# Patient Record
Sex: MALE | Race: WHITE | NOT HISPANIC OR LATINO
[De-identification: names, ages, dates, MRNs, and addresses within clinical notes are randomized per-mention and may not be internally consistent; named-entity substitution may affect disease eponyms.]

---

## 2015-01-31 RX ORDER — ASPIRIN/CALCIUM CARB/MAGNESIUM 324 MG
1 TABLET ORAL
Qty: 0 | Refills: 0 | COMMUNITY
Start: 2015-01-31

## 2015-01-31 RX ORDER — CLOPIDOGREL BISULFATE 75 MG/1
1 TABLET, FILM COATED ORAL
Qty: 0 | Refills: 0 | COMMUNITY
Start: 2015-01-31

## 2017-01-19 ENCOUNTER — RX RENEWAL (OUTPATIENT)
Age: 72
End: 2017-01-19

## 2017-02-21 ENCOUNTER — APPOINTMENT (OUTPATIENT)
Dept: GASTROENTEROLOGY | Facility: CLINIC | Age: 72
End: 2017-02-21

## 2017-02-21 VITALS
DIASTOLIC BLOOD PRESSURE: 90 MMHG | BODY MASS INDEX: 25.01 KG/M2 | TEMPERATURE: 97.9 F | WEIGHT: 165 LBS | HEIGHT: 68 IN | SYSTOLIC BLOOD PRESSURE: 160 MMHG

## 2017-02-21 DIAGNOSIS — Z87.898 PERSONAL HISTORY OF OTHER SPECIFIED CONDITIONS: ICD-10-CM

## 2017-02-21 DIAGNOSIS — E73.9 LACTOSE INTOLERANCE, UNSPECIFIED: ICD-10-CM

## 2017-02-21 DIAGNOSIS — Z78.9 OTHER SPECIFIED HEALTH STATUS: ICD-10-CM

## 2017-03-20 ENCOUNTER — RX RENEWAL (OUTPATIENT)
Age: 72
End: 2017-03-20

## 2017-04-11 ENCOUNTER — APPOINTMENT (OUTPATIENT)
Dept: UROLOGY | Facility: CLINIC | Age: 72
End: 2017-04-11

## 2017-04-11 ENCOUNTER — OUTPATIENT (OUTPATIENT)
Dept: OUTPATIENT SERVICES | Facility: HOSPITAL | Age: 72
LOS: 1 days | End: 2017-04-11
Payer: MEDICARE

## 2017-04-11 DIAGNOSIS — R35.0 FREQUENCY OF MICTURITION: ICD-10-CM

## 2017-04-11 DIAGNOSIS — Z98.89 OTHER SPECIFIED POSTPROCEDURAL STATES: Chronic | ICD-10-CM

## 2017-04-11 DIAGNOSIS — H26.9 UNSPECIFIED CATARACT: Chronic | ICD-10-CM

## 2017-04-11 DIAGNOSIS — K08.409 PARTIAL LOSS OF TEETH, UNSPECIFIED CAUSE, UNSPECIFIED CLASS: Chronic | ICD-10-CM

## 2017-04-11 DIAGNOSIS — N20.1 CALCULUS OF URETER: Chronic | ICD-10-CM

## 2017-04-11 DIAGNOSIS — N20.1 CALCULUS OF URETER: ICD-10-CM

## 2017-04-11 PROCEDURE — 76775 US EXAM ABDO BACK WALL LIM: CPT

## 2017-07-24 ENCOUNTER — OTHER (OUTPATIENT)
Age: 72
End: 2017-07-24

## 2017-07-31 ENCOUNTER — EMERGENCY (EMERGENCY)
Facility: HOSPITAL | Age: 72
LOS: 1 days | Discharge: ANOTHER HOSPITAL | End: 2017-07-31
Attending: EMERGENCY MEDICINE | Admitting: EMERGENCY MEDICINE
Payer: MEDICARE

## 2017-07-31 VITALS
WEIGHT: 160.06 LBS | HEART RATE: 81 BPM | DIASTOLIC BLOOD PRESSURE: 120 MMHG | OXYGEN SATURATION: 99 % | HEIGHT: 68 IN | RESPIRATION RATE: 16 BRPM | SYSTOLIC BLOOD PRESSURE: 211 MMHG | TEMPERATURE: 98 F

## 2017-07-31 DIAGNOSIS — Z95.5 PRESENCE OF CORONARY ANGIOPLASTY IMPLANT AND GRAFT: ICD-10-CM

## 2017-07-31 DIAGNOSIS — I25.2 OLD MYOCARDIAL INFARCTION: ICD-10-CM

## 2017-07-31 DIAGNOSIS — Z79.82 LONG TERM (CURRENT) USE OF ASPIRIN: ICD-10-CM

## 2017-07-31 DIAGNOSIS — H26.9 UNSPECIFIED CATARACT: Chronic | ICD-10-CM

## 2017-07-31 DIAGNOSIS — Z87.891 PERSONAL HISTORY OF NICOTINE DEPENDENCE: ICD-10-CM

## 2017-07-31 DIAGNOSIS — R07.89 OTHER CHEST PAIN: ICD-10-CM

## 2017-07-31 DIAGNOSIS — Z98.89 OTHER SPECIFIED POSTPROCEDURAL STATES: Chronic | ICD-10-CM

## 2017-07-31 DIAGNOSIS — I25.10 ATHEROSCLEROTIC HEART DISEASE OF NATIVE CORONARY ARTERY WITHOUT ANGINA PECTORIS: Chronic | ICD-10-CM

## 2017-07-31 DIAGNOSIS — N20.1 CALCULUS OF URETER: Chronic | ICD-10-CM

## 2017-07-31 DIAGNOSIS — Z79.01 LONG TERM (CURRENT) USE OF ANTICOAGULANTS: ICD-10-CM

## 2017-07-31 DIAGNOSIS — I20.0 UNSTABLE ANGINA: ICD-10-CM

## 2017-07-31 DIAGNOSIS — K08.409 PARTIAL LOSS OF TEETH, UNSPECIFIED CAUSE, UNSPECIFIED CLASS: Chronic | ICD-10-CM

## 2017-07-31 LAB
ANION GAP SERPL CALC-SCNC: 6 MMOL/L — SIGNIFICANT CHANGE UP (ref 5–17)
APTT BLD: 28.2 SEC — SIGNIFICANT CHANGE UP (ref 27.5–37.4)
BASOPHILS # BLD AUTO: 0.1 K/UL — SIGNIFICANT CHANGE UP (ref 0–0.2)
BASOPHILS NFR BLD AUTO: 0.8 % — SIGNIFICANT CHANGE UP (ref 0–2)
BUN SERPL-MCNC: 23 MG/DL — SIGNIFICANT CHANGE UP (ref 7–23)
CALCIUM SERPL-MCNC: 9.4 MG/DL — SIGNIFICANT CHANGE UP (ref 8.5–10.1)
CHLORIDE SERPL-SCNC: 107 MMOL/L — SIGNIFICANT CHANGE UP (ref 96–108)
CK MB BLD-MCNC: 1.7 % — SIGNIFICANT CHANGE UP (ref 0–3.5)
CK MB CFR SERPL CALC: 1.4 NG/ML — SIGNIFICANT CHANGE UP (ref 0–3.6)
CK SERPL-CCNC: 82 U/L — SIGNIFICANT CHANGE UP (ref 26–308)
CO2 SERPL-SCNC: 28 MMOL/L — SIGNIFICANT CHANGE UP (ref 22–31)
CREAT SERPL-MCNC: 1.2 MG/DL — SIGNIFICANT CHANGE UP (ref 0.5–1.3)
EOSINOPHIL # BLD AUTO: 0.2 K/UL — SIGNIFICANT CHANGE UP (ref 0–0.5)
EOSINOPHIL NFR BLD AUTO: 2.4 % — SIGNIFICANT CHANGE UP (ref 0–6)
GLUCOSE SERPL-MCNC: 122 MG/DL — HIGH (ref 70–99)
HCT VFR BLD CALC: 43 % — SIGNIFICANT CHANGE UP (ref 39–50)
HGB BLD-MCNC: 14.4 G/DL — SIGNIFICANT CHANGE UP (ref 13–17)
INR BLD: 1.02 RATIO — SIGNIFICANT CHANGE UP (ref 0.88–1.16)
LYMPHOCYTES # BLD AUTO: 1.2 K/UL — SIGNIFICANT CHANGE UP (ref 1–3.3)
LYMPHOCYTES # BLD AUTO: 16.9 % — SIGNIFICANT CHANGE UP (ref 13–44)
MCHC RBC-ENTMCNC: 31.2 PG — SIGNIFICANT CHANGE UP (ref 27–34)
MCHC RBC-ENTMCNC: 33.4 GM/DL — SIGNIFICANT CHANGE UP (ref 32–36)
MCV RBC AUTO: 93.4 FL — SIGNIFICANT CHANGE UP (ref 80–100)
MONOCYTES # BLD AUTO: 0.6 K/UL — SIGNIFICANT CHANGE UP (ref 0–0.9)
MONOCYTES NFR BLD AUTO: 9.2 % — HIGH (ref 1–9)
NEUTROPHILS # BLD AUTO: 4.9 K/UL — SIGNIFICANT CHANGE UP (ref 1.8–7.4)
NEUTROPHILS NFR BLD AUTO: 70.7 % — SIGNIFICANT CHANGE UP (ref 43–77)
PLATELET # BLD AUTO: 150 K/UL — SIGNIFICANT CHANGE UP (ref 150–400)
POTASSIUM SERPL-MCNC: 4.8 MMOL/L — SIGNIFICANT CHANGE UP (ref 3.5–5.3)
POTASSIUM SERPL-SCNC: 4.8 MMOL/L — SIGNIFICANT CHANGE UP (ref 3.5–5.3)
PROTHROM AB SERPL-ACNC: 11.1 SEC — SIGNIFICANT CHANGE UP (ref 9.8–12.7)
RBC # BLD: 4.6 M/UL — SIGNIFICANT CHANGE UP (ref 4.2–5.8)
RBC # FLD: 13.3 % — SIGNIFICANT CHANGE UP (ref 10.3–14.5)
SODIUM SERPL-SCNC: 141 MMOL/L — SIGNIFICANT CHANGE UP (ref 135–145)
TROPONIN I SERPL-MCNC: <.015 NG/ML — SIGNIFICANT CHANGE UP (ref 0.01–0.04)
WBC # BLD: 6.9 K/UL — SIGNIFICANT CHANGE UP (ref 3.8–10.5)
WBC # FLD AUTO: 6.9 K/UL — SIGNIFICANT CHANGE UP (ref 3.8–10.5)

## 2017-07-31 PROCEDURE — 71010: CPT | Mod: 26

## 2017-07-31 PROCEDURE — 99285 EMERGENCY DEPT VISIT HI MDM: CPT

## 2017-07-31 RX ORDER — LOSARTAN POTASSIUM 100 MG/1
50 TABLET, FILM COATED ORAL ONCE
Qty: 0 | Refills: 0 | Status: COMPLETED | OUTPATIENT
Start: 2017-07-31 | End: 2017-07-31

## 2017-07-31 RX ORDER — ASPIRIN/CALCIUM CARB/MAGNESIUM 324 MG
325 TABLET ORAL ONCE
Qty: 0 | Refills: 0 | Status: COMPLETED | OUTPATIENT
Start: 2017-07-31 | End: 2017-07-31

## 2017-07-31 RX ORDER — ATORVASTATIN CALCIUM 80 MG/1
10 TABLET, FILM COATED ORAL ONCE
Qty: 0 | Refills: 0 | Status: DISCONTINUED | OUTPATIENT
Start: 2017-07-31 | End: 2017-08-04

## 2017-07-31 RX ADMIN — ATORVASTATIN CALCIUM 10 MILLIGRAM(S): 80 TABLET, FILM COATED ORAL at 22:09

## 2017-07-31 RX ADMIN — Medication 325 MILLIGRAM(S): at 20:08

## 2017-07-31 RX ADMIN — LOSARTAN POTASSIUM 50 MILLIGRAM(S): 100 TABLET, FILM COATED ORAL at 22:09

## 2017-07-31 NOTE — ED ADULT NURSE NOTE - OBJECTIVE STATEMENT
alert oriented x3 no distress noted pt c/o chest pressure and high b/p ekg done and placed on cardiac monitor blood work drawned and sent to lab

## 2017-07-31 NOTE — ED ADULT NURSE NOTE - PSH
CAD (coronary artery disease)  8 stents CAD (coronary artery disease)  8 stents  S/P coronary angioplasty  8 stents

## 2017-07-31 NOTE — ED PROVIDER NOTE - OBJECTIVE STATEMENT
71 y/o M with h/o CAD c/o chest pain for the past 5 hours.   L sided pressure.  Radiates down the L arm.  Denies SOB, vomiting, or any other complaints. 71 y/o M with h/o CAD with multiple stents c/o chest pain for the past 5 hours.   L sided pressure.  Radiates down the L arm.  Denies SOB, vomiting, or any other complaints.

## 2017-07-31 NOTE — ED PROVIDER NOTE - PROGRESS NOTE DETAILS
Pt resting comfortably.  Chest pain resolved. Will repeat cardiac enzymes, continued monitoring, cardiology consult. D/W pts cardiologist Dr Chavis. He will d/w pts interventional cardiologist Dr Long for possible transfer to [facility] in the morning. [facility] cath lab called rn for report, pt to go to [facility] for cath and eval by dr Mya mar md. Dr العلي cardiology here is evaluating pt now.

## 2017-08-01 ENCOUNTER — TRANSCRIPTION ENCOUNTER (OUTPATIENT)
Age: 72
End: 2017-08-01

## 2017-08-01 ENCOUNTER — OUTPATIENT (OUTPATIENT)
Dept: INPATIENT UNIT | Facility: HOSPITAL | Age: 72
LOS: 1 days | Discharge: HOME | End: 2017-08-01
Payer: MEDICARE

## 2017-08-01 VITALS
DIASTOLIC BLOOD PRESSURE: 66 MMHG | HEART RATE: 76 BPM | SYSTOLIC BLOOD PRESSURE: 162 MMHG | RESPIRATION RATE: 14 BRPM | OXYGEN SATURATION: 99 %

## 2017-08-01 DIAGNOSIS — Z98.61 CORONARY ANGIOPLASTY STATUS: Chronic | ICD-10-CM

## 2017-08-01 DIAGNOSIS — N20.1 CALCULUS OF URETER: Chronic | ICD-10-CM

## 2017-08-01 DIAGNOSIS — H26.9 UNSPECIFIED CATARACT: Chronic | ICD-10-CM

## 2017-08-01 DIAGNOSIS — I25.10 ATHEROSCLEROTIC HEART DISEASE OF NATIVE CORONARY ARTERY WITHOUT ANGINA PECTORIS: Chronic | ICD-10-CM

## 2017-08-01 DIAGNOSIS — R07.9 CHEST PAIN, UNSPECIFIED: ICD-10-CM

## 2017-08-01 DIAGNOSIS — Z98.89 OTHER SPECIFIED POSTPROCEDURAL STATES: Chronic | ICD-10-CM

## 2017-08-01 DIAGNOSIS — K08.409 PARTIAL LOSS OF TEETH, UNSPECIFIED CAUSE, UNSPECIFIED CLASS: Chronic | ICD-10-CM

## 2017-08-01 LAB
CK MB BLD-MCNC: 1 % — SIGNIFICANT CHANGE UP (ref 0–3.5)
CK MB BLD-MCNC: 1.5 % — SIGNIFICANT CHANGE UP (ref 0–3.5)
CK MB CFR SERPL CALC: 0.9 NG/ML — SIGNIFICANT CHANGE UP (ref 0–3.6)
CK MB CFR SERPL CALC: 1 NG/ML — SIGNIFICANT CHANGE UP (ref 0–3.6)
CK SERPL-CCNC: 66 U/L — SIGNIFICANT CHANGE UP (ref 26–308)
CK SERPL-CCNC: 87 U/L — SIGNIFICANT CHANGE UP (ref 26–308)
D DIMER BLD IA.RAPID-MCNC: 167 NG/ML — SIGNIFICANT CHANGE UP
TROPONIN I SERPL-MCNC: <.015 NG/ML — SIGNIFICANT CHANGE UP (ref 0.01–0.04)
TROPONIN I SERPL-MCNC: <.015 NG/ML — SIGNIFICANT CHANGE UP (ref 0.01–0.04)

## 2017-08-01 PROCEDURE — 85027 COMPLETE CBC AUTOMATED: CPT

## 2017-08-01 PROCEDURE — 36415 COLL VENOUS BLD VENIPUNCTURE: CPT

## 2017-08-01 PROCEDURE — 85610 PROTHROMBIN TIME: CPT

## 2017-08-01 PROCEDURE — 80048 BASIC METABOLIC PNL TOTAL CA: CPT

## 2017-08-01 PROCEDURE — 82553 CREATINE MB FRACTION: CPT

## 2017-08-01 PROCEDURE — 93010 ELECTROCARDIOGRAM REPORT: CPT | Mod: 76

## 2017-08-01 PROCEDURE — 99285 EMERGENCY DEPT VISIT HI MDM: CPT | Mod: 25

## 2017-08-01 PROCEDURE — 96374 THER/PROPH/DIAG INJ IV PUSH: CPT

## 2017-08-01 PROCEDURE — 85730 THROMBOPLASTIN TIME PARTIAL: CPT

## 2017-08-01 PROCEDURE — 99291 CRITICAL CARE FIRST HOUR: CPT

## 2017-08-01 PROCEDURE — 84484 ASSAY OF TROPONIN QUANT: CPT

## 2017-08-01 PROCEDURE — 93005 ELECTROCARDIOGRAM TRACING: CPT

## 2017-08-01 PROCEDURE — 71045 X-RAY EXAM CHEST 1 VIEW: CPT

## 2017-08-01 PROCEDURE — 82550 ASSAY OF CK (CPK): CPT

## 2017-08-01 RX ORDER — SODIUM CHLORIDE 9 MG/ML
3 INJECTION INTRAMUSCULAR; INTRAVENOUS; SUBCUTANEOUS EVERY 8 HOURS
Qty: 0 | Refills: 0 | Status: DISCONTINUED | OUTPATIENT
Start: 2017-08-01 | End: 2017-08-01

## 2017-08-01 RX ORDER — ASPIRIN/CALCIUM CARB/MAGNESIUM 324 MG
81 TABLET ORAL ONCE
Qty: 0 | Refills: 0 | Status: COMPLETED | OUTPATIENT
Start: 2017-08-01 | End: 2017-08-01

## 2017-08-01 RX ORDER — LOSARTAN POTASSIUM 100 MG/1
50 TABLET, FILM COATED ORAL ONCE
Qty: 0 | Refills: 0 | Status: COMPLETED | OUTPATIENT
Start: 2017-08-01 | End: 2017-08-01

## 2017-08-01 RX ORDER — ALPRAZOLAM 0.25 MG
0.25 TABLET ORAL ONCE
Qty: 0 | Refills: 0 | Status: DISCONTINUED | OUTPATIENT
Start: 2017-08-01 | End: 2017-08-01

## 2017-08-01 RX ORDER — LABETALOL HCL 100 MG
20 TABLET ORAL ONCE
Qty: 0 | Refills: 0 | Status: COMPLETED | OUTPATIENT
Start: 2017-08-01 | End: 2017-08-01

## 2017-08-01 RX ORDER — ACETAMINOPHEN 500 MG
650 TABLET ORAL EVERY 6 HOURS
Qty: 0 | Refills: 0 | Status: DISCONTINUED | OUTPATIENT
Start: 2017-08-01 | End: 2017-08-01

## 2017-08-01 RX ORDER — SODIUM CHLORIDE 9 MG/ML
500 INJECTION INTRAMUSCULAR; INTRAVENOUS; SUBCUTANEOUS
Qty: 0 | Refills: 0 | Status: DISCONTINUED | OUTPATIENT
Start: 2017-08-01 | End: 2017-08-01

## 2017-08-01 RX ORDER — METOPROLOL TARTRATE 50 MG
5 TABLET ORAL ONCE
Qty: 0 | Refills: 0 | Status: COMPLETED | OUTPATIENT
Start: 2017-08-01 | End: 2017-08-01

## 2017-08-01 RX ORDER — CLOPIDOGREL BISULFATE 75 MG/1
75 TABLET, FILM COATED ORAL ONCE
Qty: 0 | Refills: 0 | Status: COMPLETED | OUTPATIENT
Start: 2017-08-01 | End: 2017-08-01

## 2017-08-01 RX ORDER — METOPROLOL TARTRATE 50 MG
50 TABLET ORAL ONCE
Qty: 0 | Refills: 0 | Status: COMPLETED | OUTPATIENT
Start: 2017-08-01 | End: 2017-08-01

## 2017-08-01 RX ORDER — ACETAMINOPHEN 500 MG
2 TABLET ORAL
Qty: 0 | Refills: 0 | COMMUNITY
Start: 2017-08-01

## 2017-08-01 RX ADMIN — Medication 81 MILLIGRAM(S): at 10:27

## 2017-08-01 RX ADMIN — Medication 5 MILLIGRAM(S): at 08:26

## 2017-08-01 RX ADMIN — LOSARTAN POTASSIUM 50 MILLIGRAM(S): 100 TABLET, FILM COATED ORAL at 10:43

## 2017-08-01 RX ADMIN — Medication 50 MILLIGRAM(S): at 08:26

## 2017-08-01 RX ADMIN — Medication 650 MILLIGRAM(S): at 14:55

## 2017-08-01 RX ADMIN — CLOPIDOGREL BISULFATE 75 MILLIGRAM(S): 75 TABLET, FILM COATED ORAL at 10:27

## 2017-08-01 RX ADMIN — Medication 0.25 MILLIGRAM(S): at 01:01

## 2017-08-01 RX ADMIN — Medication 20 MILLIGRAM(S): at 10:28

## 2017-08-01 RX ADMIN — SODIUM CHLORIDE 75 MILLILITER(S): 9 INJECTION INTRAMUSCULAR; INTRAVENOUS; SUBCUTANEOUS at 12:40

## 2017-08-01 NOTE — H&P CARDIOLOGY - FAMILY HISTORY
Mother  Still living? No  Family history of coronary artery disease, Age at diagnosis: Age Unknown  Family history of CABG, Age at diagnosis: Age Unknown

## 2017-08-01 NOTE — DISCHARGE NOTE ADULT - PATIENT PORTAL LINK FT
“You can access the Follow[facility] Patient Portal, offered by [facility], by registering with the following website: [email or web address]”

## 2017-08-01 NOTE — DISCHARGE NOTE ADULT - INSTRUCTIONS
No driving x 24 hours.  No heavy lifting for one week. No strenuous activity x 3 week.  Monitor site of procedure and notify your doctor for any redness/swelling/discharge.  You may shower but no baths or swimming for one week or until skin is healed.

## 2017-08-01 NOTE — ED ADULT NURSE REASSESSMENT NOTE - NS ED NURSE REASSESS COMMENT FT1
pt aox3, " light headed, denies chest pain, no sob, no n/v, lungs clear, c m RSR, awaiting for Dr Kramer
pt aox3, transferred to [facility], stable, no changes in c m, report, medical records given to Paramedic, consent for transfer on chart
pt stable medicated as ordered awaiting cardiac enzyme at 2 am
pt reavaluated  vital signs stable pt  awaiting MD العلي Pt resting with no c/o of disconfort
pt resting at present and medicated as ordered and ce sent awaiting for 3rd ce at 8am

## 2017-08-01 NOTE — DISCHARGE NOTE ADULT - CARE PLAN
Principal Discharge DX:	CAD (coronary artery disease)  Goal:	remain chest pain free  Instructions for follow-up, activity and diet:	Follow up with Dr Adams in 1 week  ASA daily indefinitely  Plavix daily for minimum of 1 year  low salt, low fat, low cholesterol  Secondary Diagnosis:	HTN (hypertension)  Goal:	/60  Instructions for follow-up, activity and diet:	increase losartan to 100mg po daily  low salt, low fat, low cholesterol

## 2017-08-01 NOTE — H&P CARDIOLOGY - HISTORY OF PRESENT ILLNESS
72 year old male with HTN, hyperlipidemia, BPH, known CAD with multiple PTCA/stents of LCx/RCA/LAD who presented to [facility] ED 7/31/17 complaining of SOB and chest pressure. Patient describes his symptoms as SOB over the past 1 week with varying level of activity, however, yesterday he developed sudden onset of left sided chest pressure radiating to his left arm while sitting watching television which prompted him to present to the ED. Patient R/O MI with negative troponins.   In light of patients CAD history and symptoms there is high suspicion for CAD progression. Patient is now transferred to [facility] 8/1/17  for a cardiac catheterization with possible PTCA/stent.   Patient without complaints on arrival to [facility].

## 2017-08-01 NOTE — H&P CARDIOLOGY - COMMENTS
ASA 81mg po x1 will give ASA 81mg po x1 and Plavix 75mg po x1 as patient did not receive at [facility]

## 2017-08-01 NOTE — ED ADULT NURSE REASSESSMENT NOTE - GENERAL PATIENT STATE
comfortable appearance/improvement verbalized/cooperative
comfortable appearance
comfortable appearance

## 2017-08-01 NOTE — CONSULT NOTE ADULT - SUBJECTIVE AND OBJECTIVE BOX
[facility] Consultation    CHIEF COMPLAINT: Patient is a 72y old  Male who presents with a chief complaint of chest pain    HPI:  patient developed chest pain yesterday afternoon at rest. He describes this as a mild to moderate upper left sided pressure with some tingling in the left arm. Different from anginal chest pain he has had in the past clear retrosternal pain. The symptoms waxed and waned throughout the day he came to the emergency room. He also noted some shortness of breath which he describes as difficulty catching a deep breath. He reports no palpitations, diaphoresis, nausea, or vomiting    Past medical history is remarkable for myocardial infarction with reported normal ejection fraction, multiple percutaneous interventions with a stent, the last in 2015    PAST MEDICAL & SURGICAL HISTORY:  MI (myocardial infarction)  Diverticulosis  S/P coronary angioplasty: 8 stents  CAD (coronary artery disease): 8 stents      SOCIAL HISTORY: no tob/etoh; remote tobacco    FAMILY HISTORY: mother passed away with a history of heart failure, mother passed away with history of Hodgkin's disease      MEDICATIONS  (STANDING):Toprol, pravastatin, losartan, aspirin, allopurinol, Bentyl, Plavix          Allergies    No Known Drug Allergies  strawberry (Hives)    Intolerances        REVIEW OF SYSTEMS:    CONSTITUTIONAL: No weakness, fevers or chills  EYES: No visual changes, No diplopia  ENMT: No throat pain , No exudate  NECK: No pain or stiffness  RESPIRATORY: No cough, wheezing, hemoptysis; No shortness of breath  CARDIOVASCULAR:  chest pain as above, no palpitations  GASTROINTESTINAL: No abdominal pain. No nausea, vomiting, or hematemesis; No diarrhea or constipation. No melena or hematochezia.  GENITOURINARY: No dysuria, frequency or hematuria  NEUROLOGICAL: No numbness or weakness  SKIN: No itching or rash  All other review of systems is negative unless indicated above    VITAL SIGNS:   Vital Signs Last 24 Hrs  T(C): 36.7 (01 Aug 2017 08:02), Max: 36.9 (31 Jul 2017 19:29)  T(F): 98 (01 Aug 2017 08:02), Max: 98.5 (31 Jul 2017 19:29)  HR: 76 (01 Aug 2017 08:37) (73 - 88)  BP: 162/66 (01 Aug 2017 08:37) (156/75 - 211/120)  BP(mean): --  RR: 14 (01 Aug 2017 08:37) (14 - 16)  SpO2: 99% (01 Aug 2017 08:37) (97% - 99%)    I&O's Summary      PHYSICAL EXAM:    Constitutional: NAD, awake and alert, well-developed  Eyes:  EOMI,  Pupils round, no lesions  ENMT: no exudate or erythema  Pulmonary: Non-labored, breath sounds are clear bilaterally, No wheezing, rales or rhonchi  Cardiovascular: PMI  non-displaced Regular S1 and S2, no murmurs, rubs, gallops or clicks  Gastrointestinal: Bowel Sounds present, soft, nontender.   Lymph: No peripheral edema. No cervical lymphadenopathy.  Neurological: Alert, no focal deficits  Skin: No rashes.  No cyanosis.  Psych:  Mood & affect appropriate    LABS: All Labs Reviewed:                        14.4   6.9   )-----------( 150      ( 31 Jul 2017 20:16 )             43.0     31 Jul 2017 20:16    141    |  107    |  23     ----------------------------<  122    4.8     |  28     |  1.20     Ca    9.4        31 Jul 2017 20:16      PT/INR - ( 31 Jul 2017 20:16 )   PT: 11.1 sec;   INR: 1.02 ratio         PTT - ( 31 Jul 2017 20:16 )  PTT:28.2 sec  CARDIAC MARKERS ( 01 Aug 2017 08:16 )  <.015 ng/mL / x     / 87 U/L / x     / 0.9 ng/mL  CARDIAC MARKERS ( 01 Aug 2017 02:14 )  <.015 ng/mL / x     / 66 U/L / x     / 1.0 ng/mL  CARDIAC MARKERS ( 31 Jul 2017 20:16 )  <.015 ng/mL / x     / 82 U/L / x     / 1.4 ng/mL              RADIOLOGY/EKG:    electrocardiogram revealed sinus rhythm, possible left ventricular hypertrophy, no other significant abnormality  < from: Xray Chest 1 View AP/PA (07.31.17 @ 20:15) >    EXAM:  CHEST 1 VIEW                            PROCEDURE DATE:  07/31/2017          INTERPRETATION:  Clinical information: Chest pain    No prior studies present for comparison  Portable study, 8:06 PM  Cardiac monitor leads present.     The aorta shows atherosclerotic changes. The lungs are clear. There is no   sign of infiltrate effusion or congestive failure. Heart size is within   normal limits. Hilar regions and mediastinal contours are unremarkable.    The bony thorax appears intact. Coronary artery stent overlies right side   of heart, correlate clinically.    Air lucency along right paratracheal region, any history of esophageal   motility disorder or achalasia? Correlate clinically.    IMPRESSION:    See above report                KULWINDER HARRIS M.D.,ATTENDING RADIOLOGIST  This document has been electronically signed. Jul 31 2017  8:22PM                < end of copied text >

## 2017-08-01 NOTE — DISCHARGE NOTE ADULT - HOSPITAL COURSE
72 year old male with HTN, hyperlipidemia, BPH, known CAD with multiple PTCA/stents of LCx/RCA/LAD who presented to [facility] ED 7/31/17 complaining of SOB and chest pressure. Patient describes his symptoms as SOB over the past 1 week with varying level of activity, however, yesterday he developed sudden onset of left sided chest pressure radiating to his left arm while sitting watching television which prompted him to present to the ED. Patient R/O MI with negative troponins.   In light of patients CAD history and symptoms there is high suspicion for CAD progression. Patient is now transferred to [facility] 8/1/17  for a cardiac catheterization with possible PTCA/stent.   Patient without complaints on arrival to [facility].   Underwent a Cardiac catheterization via right femoral artery access revealing patent prior stent to LAD, patnet stent LCx, distal LCx 95% treated with Resolute [place] stent. Discharge instructions reviewed with patient regarding need for dual antiplatelet therapy which he was been taking for many years. His SBP has been running 170-200's, losartan dose increased to 100mg po daily.   Patient stable for discharge to home if right groin site remains stable with no bleed, no hematoma, DP 2+ with outpatient cardiologist follow up with Dr Chavis. 72 year old male with HTN, hyperlipidemia, BPH, known CAD with multiple PTCA/stents of LCx/RCA/LAD who presented to [facility] ED 7/31/17 complaining of SOB and chest pressure. Patient describes his symptoms as SOB over the past 1 week with varying level of activity, however, yesterday he developed sudden onset of left sided chest pressure radiating to his left arm while sitting watching television which prompted him to present to the ED. Patient R/O MI with negative troponins.   In light of patients CAD history and symptoms there is high suspicion for CAD progression. Patient is now transferred to [facility] 8/1/17  for a cardiac catheterization with possible PTCA/stent.   Patient without complaints on arrival to [facility].   Underwent a Cardiac catheterization via right femoral artery access revealing patent prior stent to LAD, patnet stent LCx, distal LCx 95% treated with Resolute [place] stent. Discharge instructions reviewed with patient regarding need for dual antiplatelet therapy which he was been taking for many years. BP has improved post procedure as patient less anxious.   Patient stable for discharge to home if right femoral artery angioseal closure device site remains stable with no bleed, no hematoma, DP 2+ with outpatient cardiologist follow up with Dr Chavis. 72 year old male with HTN, hyperlipidemia, BPH, known CAD with multiple PTCA/stents of LCx/RCA/LAD who presented to [facility] ED 7/31/17 complaining of SOB and chest pressure. Patient describes his symptoms as SOB over the past 1 week with varying level of activity, however, yesterday he developed sudden onset of left sided chest pressure radiating to his left arm while sitting watching television which prompted him to present to the ED. Patient R/O MI with negative troponins.   In light of patients CAD history and symptoms there is high suspicion for CAD progression. Patient is now transferred to [facility] 8/1/17  for a cardiac catheterization with possible PTCA/stent.   Patient without complaints on arrival to [facility].   Underwent a Cardiac catheterization via right femoral artery access revealing patent prior stent to LAD, patnet stent LCx, distal LCx 95% treated with Resolute [place] stent. Discharge instructions reviewed with patient regarding need for dual antiplatelet therapy which he was been taking for many years. BP has improved post procedure as patient less anxious. DDimer negative and labs unremarkable.   Patient stable for discharge to home if right femoral artery angioseal closure device site remains stable with no bleed, no hematoma, DP 2+ with outpatient cardiologist follow up with Dr Chavis.

## 2017-08-01 NOTE — DISCHARGE NOTE ADULT - MEDICATION SUMMARY - MEDICATIONS TO TAKE
I will START or STAY ON the medications listed below when I get home from the hospital:    acetaminophen 325 mg oral tablet  -- 2 tab(s) by mouth every 6 hours, As needed, mild to moderate pain 1-6  -- Indication: For mild to moderate site discomfort    aspirin 81 mg oral delayed release tablet  -- 1 tab(s) by mouth once a day indefinitely   -- Indication: For CAD (coronary artery disease)/stents    losartan 50 mg oral tablet  -- 1 tab(s) by mouth 2 times a day  -- Indication: For HTN (hypertension)    allopurinol 100 mg oral tablet  -- 1 tab(s) by mouth 3 times a day (after meals)  -- Indication: For gout    pravastatin 40 mg oral tablet  -- 1 tab(s) by mouth once a day (at bedtime)  -- Indication: For Hyperlipidemia    clopidogrel 75 mg oral tablet  -- 1 tab(s) by mouth once a day for minimum 1 year  -- Indication: For CAD (coronary artery disease)/stents    metoprolol tartrate 50 mg oral tablet  -- 1 tab(s) by mouth 3 times a day  -- Indication: For CAD (coronary artery disease)    Librax 5 mg-2.5 mg oral capsule  -- 1 cap(s) by mouth 2 times a day  -- Indication: For Diverticulosis    folic acid 1 mg oral tablet  -- 3 tab(s) by mouth once a day  -- Indication: For supplement

## 2017-08-01 NOTE — H&P CARDIOLOGY - PMH
BPH (benign prostatic hyperplasia)    CAD (coronary artery disease)    Enlarged aorta    GERD (gastroesophageal reflux disease)    Gout    HTN (hypertension)    Hypercholesteremia    MI, old  2001  S/P coronary artery stent placement  2001 LCx and RCA, 6/2014 & 1/2015 BPH (benign prostatic hyperplasia)    CAD (coronary artery disease)    Diverticulosis    Enlarged aorta    GERD (gastroesophageal reflux disease)    Gout    HTN (hypertension)    Hypercholesteremia    MI, old  2001  S/P coronary artery stent placement  2001 LCx and RCA, 6/2014 & 1/2015

## 2017-08-01 NOTE — DISCHARGE NOTE ADULT - PLAN OF CARE
remain chest pain free Follow up with Dr Adams in 1 week  ASA daily indefinitely  Plavix daily for minimum of 1 year  low salt, low fat, low cholesterol /60 increase losartan to 100mg po daily  low salt, low fat, low cholesterol

## 2017-08-01 NOTE — H&P CARDIOLOGY - PSH
Cataract  s/p extraction bilat 2/2014  H/O tooth extraction    S/P angioplasty with stent  x 3 ( 12/2001, 6/2014 & 01/2015)  Ureteral stone  Insertion of right  ureteral stent - 9/2015

## 2017-08-01 NOTE — CONSULT NOTE ADULT - ASSESSMENT
Ventura's chest discomfort is atypical with an unremarkable electrocardiogram and negative cardiac enzymes. We had an extensive discussion concerning etiology, natural history, and treatment of coronary artery disease. Given his symptoms and significant history of multiple percutaneous intervention, he'll be transferred to [facility] today for cardiac catheterization to better define his coronary anatomy and need for further intervention. His medications would be continued including dual antiplatelet therapy. He'll be given beta blocker intravenously for elevated blood pressure readings. Further management will be dependent upon his clinical course.

## 2017-08-01 NOTE — ED ADULT NURSE REASSESSMENT NOTE - CONDITION
pt aox3, chest pressure, seen by Dr العلي , schedule for transfer, [facility], report given to LAWSON Vásquez/shivani

## 2017-08-14 ENCOUNTER — OUTPATIENT (OUTPATIENT)
Dept: OUTPATIENT SERVICES | Facility: HOSPITAL | Age: 72
LOS: 1 days | End: 2017-08-14
Payer: MEDICARE

## 2017-08-14 DIAGNOSIS — I25.10 ATHEROSCLEROTIC HEART DISEASE OF NATIVE CORONARY ARTERY WITHOUT ANGINA PECTORIS: Chronic | ICD-10-CM

## 2017-08-14 DIAGNOSIS — K08.409 PARTIAL LOSS OF TEETH, UNSPECIFIED CAUSE, UNSPECIFIED CLASS: Chronic | ICD-10-CM

## 2017-08-14 DIAGNOSIS — Z98.61 CORONARY ANGIOPLASTY STATUS: Chronic | ICD-10-CM

## 2017-08-14 DIAGNOSIS — R91.1 SOLITARY PULMONARY NODULE: ICD-10-CM

## 2017-08-14 DIAGNOSIS — Z98.89 OTHER SPECIFIED POSTPROCEDURAL STATES: Chronic | ICD-10-CM

## 2017-08-14 DIAGNOSIS — H26.9 UNSPECIFIED CATARACT: Chronic | ICD-10-CM

## 2017-08-14 DIAGNOSIS — N20.1 CALCULUS OF URETER: Chronic | ICD-10-CM

## 2017-08-14 PROCEDURE — 71250 CT THORAX DX C-: CPT

## 2017-08-14 PROCEDURE — 74176 CT ABD & PELVIS W/O CONTRAST: CPT

## 2017-08-14 PROCEDURE — 74176 CT ABD & PELVIS W/O CONTRAST: CPT | Mod: 26

## 2017-08-14 PROCEDURE — 71250 CT THORAX DX C-: CPT | Mod: 26

## 2017-08-21 ENCOUNTER — APPOINTMENT (OUTPATIENT)
Dept: GASTROENTEROLOGY | Facility: CLINIC | Age: 72
End: 2017-08-21
Payer: MEDICARE

## 2017-08-21 VITALS
BODY MASS INDEX: 25.46 KG/M2 | OXYGEN SATURATION: 99 % | HEART RATE: 76 BPM | SYSTOLIC BLOOD PRESSURE: 130 MMHG | HEIGHT: 68 IN | DIASTOLIC BLOOD PRESSURE: 80 MMHG | TEMPERATURE: 98 F | WEIGHT: 168 LBS

## 2017-08-21 DIAGNOSIS — N40.0 BENIGN PROSTATIC HYPERPLASIA WITHOUT LOWER URINARY TRACT SYMPMS: ICD-10-CM

## 2017-08-21 DIAGNOSIS — Z86.79 PERSONAL HISTORY OF OTHER DISEASES OF THE CIRCULATORY SYSTEM: ICD-10-CM

## 2017-08-21 PROCEDURE — 99214 OFFICE O/P EST MOD 30 MIN: CPT

## 2017-08-21 RX ORDER — DICYCLOMINE HYDROCHLORIDE 10 MG/1
10 CAPSULE ORAL
Qty: 60 | Refills: 1 | Status: DISCONTINUED | COMMUNITY
Start: 2017-07-24 | End: 2017-08-21

## 2017-08-24 ENCOUNTER — APPOINTMENT (OUTPATIENT)
Dept: CARDIOLOGY | Facility: CLINIC | Age: 72
End: 2017-08-24

## 2017-08-29 ENCOUNTER — RX RENEWAL (OUTPATIENT)
Age: 72
End: 2017-08-29

## 2017-10-20 ENCOUNTER — MEDICATION RENEWAL (OUTPATIENT)
Age: 72
End: 2017-10-20

## 2017-11-15 ENCOUNTER — EMERGENCY (EMERGENCY)
Facility: HOSPITAL | Age: 72
LOS: 1 days | Discharge: HOME | End: 2017-11-15
Attending: STUDENT IN AN ORGANIZED HEALTH CARE EDUCATION/TRAINING PROGRAM | Admitting: STUDENT IN AN ORGANIZED HEALTH CARE EDUCATION/TRAINING PROGRAM
Payer: MEDICARE

## 2017-11-15 VITALS
RESPIRATION RATE: 20 BRPM | OXYGEN SATURATION: 95 % | SYSTOLIC BLOOD PRESSURE: 190 MMHG | WEIGHT: 160.06 LBS | HEART RATE: 89 BPM | HEIGHT: 68 IN | DIASTOLIC BLOOD PRESSURE: 128 MMHG

## 2017-11-15 VITALS
RESPIRATION RATE: 20 BRPM | SYSTOLIC BLOOD PRESSURE: 160 MMHG | OXYGEN SATURATION: 96 % | TEMPERATURE: 98 F | DIASTOLIC BLOOD PRESSURE: 100 MMHG | HEART RATE: 80 BPM

## 2017-11-15 DIAGNOSIS — H26.9 UNSPECIFIED CATARACT: Chronic | ICD-10-CM

## 2017-11-15 DIAGNOSIS — N20.1 CALCULUS OF URETER: Chronic | ICD-10-CM

## 2017-11-15 DIAGNOSIS — Z98.89 OTHER SPECIFIED POSTPROCEDURAL STATES: Chronic | ICD-10-CM

## 2017-11-15 DIAGNOSIS — I25.10 ATHEROSCLEROTIC HEART DISEASE OF NATIVE CORONARY ARTERY WITHOUT ANGINA PECTORIS: Chronic | ICD-10-CM

## 2017-11-15 DIAGNOSIS — K08.409 PARTIAL LOSS OF TEETH, UNSPECIFIED CAUSE, UNSPECIFIED CLASS: Chronic | ICD-10-CM

## 2017-11-15 DIAGNOSIS — Z98.61 CORONARY ANGIOPLASTY STATUS: Chronic | ICD-10-CM

## 2017-11-15 PROCEDURE — 99284 EMERGENCY DEPT VISIT MOD MDM: CPT

## 2017-11-15 PROCEDURE — 99283 EMERGENCY DEPT VISIT LOW MDM: CPT | Mod: 25

## 2017-11-15 NOTE — ED ADULT NURSE NOTE - OBJECTIVE STATEMENT
Pt received ambulatory, alert and oriented x 3, c/o epistaxis. Pt stated nose bleed from left nostril x 2 days, used afrin and nasal packing with guaze, partial relief. Pt stated a few hours ago, pt bleeding started again, from left nostril and no relief from afrin. Pt denies fever, chills. Pt c/o of lightheadedness. No other symptoms. No acute s/s of distress.

## 2017-11-15 NOTE — ED PROVIDER NOTE - CHPI ED SYMPTOMS NEG
no vomiting/no loss of consciousness/no nausea/no fever/no numbness/no change in level of consciousness

## 2017-11-15 NOTE — ED PROVIDER NOTE - OBJECTIVE STATEMENT
72 year old male with a history of MI, BPH, cardiac stents x 8, HTN, high cholesterol presents with left nostril epistaxis.  He states it started at about 3:00am.  This occurred 2 days ago as well. He called his PMD, stopped his ASA and was advised to soak guaze in Afrin and insert it in his nose.  This successfully stopped the bleeding 2 days ago but he attempted this again this morning without success.  Patient is also on plavix. He had severe epistaxis in 2015 and had his right nostril cauterized at the time. Denies any pain, dizziness, headache. PMD [facility] [place], Cardiology Dr. Long

## 2017-11-15 NOTE — ED PROVIDER NOTE - ENMT, MLM
Airway patent, Nasal mucosa clear. Mouth with normal mucosa. Large clot removed from left nostril.  No active bleeding

## 2017-11-15 NOTE — ED PROVIDER NOTE - PMH
BPH (benign prostatic hyperplasia)    CAD (coronary artery disease)    Diverticulosis    Diverticulosis    Enlarged aorta    GERD (gastroesophageal reflux disease)    Gout    HTN (hypertension)    Hypercholesteremia    MI (myocardial infarction)    MI, old  2001  S/P coronary artery stent placement  2001 LCx and RCA, 6/2014 & 1/2015

## 2017-11-15 NOTE — ED ADULT NURSE REASSESSMENT NOTE - NS ED NURSE REASSESS COMMENT FT1
Pt received in report from LAWSON Mckeon. Pt denies any pain or discomfort as of this time. No active nose bleed noted. Pt stable and VS taken and charted. Pt evaluated by Dr. Sal. Pt stable and nursing care ongoing and safety maintained. No blood work ordered. Pt wife is at bedside.

## 2017-11-15 NOTE — ED PROVIDER NOTE - PROGRESS NOTE DETAILS
Patient observed in the ED, no further bleeding. Advised to f/u with PMD and ENT.  Return for recurrent bleeding, continue pradaxa

## 2017-11-27 ENCOUNTER — APPOINTMENT (OUTPATIENT)
Dept: OTOLARYNGOLOGY | Facility: CLINIC | Age: 72
End: 2017-11-27

## 2017-12-18 ENCOUNTER — OUTPATIENT (OUTPATIENT)
Dept: OUTPATIENT SERVICES | Facility: HOSPITAL | Age: 72
LOS: 1 days | End: 2017-12-18
Payer: MEDICARE

## 2017-12-18 DIAGNOSIS — M54.5 LOW BACK PAIN: ICD-10-CM

## 2017-12-18 DIAGNOSIS — H26.9 UNSPECIFIED CATARACT: Chronic | ICD-10-CM

## 2017-12-18 DIAGNOSIS — Z98.61 CORONARY ANGIOPLASTY STATUS: Chronic | ICD-10-CM

## 2017-12-18 DIAGNOSIS — K08.409 PARTIAL LOSS OF TEETH, UNSPECIFIED CAUSE, UNSPECIFIED CLASS: Chronic | ICD-10-CM

## 2017-12-18 DIAGNOSIS — Z98.89 OTHER SPECIFIED POSTPROCEDURAL STATES: Chronic | ICD-10-CM

## 2017-12-18 DIAGNOSIS — N20.1 CALCULUS OF URETER: Chronic | ICD-10-CM

## 2017-12-18 DIAGNOSIS — I25.10 ATHEROSCLEROTIC HEART DISEASE OF NATIVE CORONARY ARTERY WITHOUT ANGINA PECTORIS: Chronic | ICD-10-CM

## 2017-12-18 PROCEDURE — 72074 X-RAY EXAM THORAC SPINE4/>VW: CPT | Mod: 26

## 2017-12-18 PROCEDURE — 72074 X-RAY EXAM THORAC SPINE4/>VW: CPT

## 2017-12-19 ENCOUNTER — INPATIENT (INPATIENT)
Facility: HOSPITAL | Age: 72
LOS: 2 days | Discharge: HOME | DRG: 300 | End: 2017-12-22
Attending: GENERAL PRACTICE | Admitting: GENERAL PRACTICE
Payer: MEDICARE

## 2017-12-19 ENCOUNTER — EMERGENCY (EMERGENCY)
Facility: HOSPITAL | Age: 72
LOS: 1 days | Discharge: ANOTHER HOSPITAL | End: 2017-12-19
Attending: EMERGENCY MEDICINE | Admitting: EMERGENCY MEDICINE
Payer: MEDICARE

## 2017-12-19 VITALS
SYSTOLIC BLOOD PRESSURE: 178 MMHG | OXYGEN SATURATION: 99 % | HEART RATE: 72 BPM | DIASTOLIC BLOOD PRESSURE: 92 MMHG | RESPIRATION RATE: 16 BRPM

## 2017-12-19 VITALS
HEART RATE: 75 BPM | DIASTOLIC BLOOD PRESSURE: 98 MMHG | OXYGEN SATURATION: 98 % | RESPIRATION RATE: 16 BRPM | SYSTOLIC BLOOD PRESSURE: 168 MMHG

## 2017-12-19 VITALS
HEART RATE: 82 BPM | TEMPERATURE: 98 F | OXYGEN SATURATION: 96 % | SYSTOLIC BLOOD PRESSURE: 187 MMHG | DIASTOLIC BLOOD PRESSURE: 98 MMHG | RESPIRATION RATE: 16 BRPM

## 2017-12-19 DIAGNOSIS — K08.409 PARTIAL LOSS OF TEETH, UNSPECIFIED CAUSE, UNSPECIFIED CLASS: Chronic | ICD-10-CM

## 2017-12-19 DIAGNOSIS — H26.9 UNSPECIFIED CATARACT: Chronic | ICD-10-CM

## 2017-12-19 DIAGNOSIS — I25.10 ATHEROSCLEROTIC HEART DISEASE OF NATIVE CORONARY ARTERY WITHOUT ANGINA PECTORIS: Chronic | ICD-10-CM

## 2017-12-19 DIAGNOSIS — N20.1 CALCULUS OF URETER: Chronic | ICD-10-CM

## 2017-12-19 DIAGNOSIS — Z98.61 CORONARY ANGIOPLASTY STATUS: Chronic | ICD-10-CM

## 2017-12-19 DIAGNOSIS — Z98.89 OTHER SPECIFIED POSTPROCEDURAL STATES: Chronic | ICD-10-CM

## 2017-12-19 DIAGNOSIS — I71.02 DISSECTION OF ABDOMINAL AORTA: ICD-10-CM

## 2017-12-19 DIAGNOSIS — I25.110 ATHEROSCLEROTIC HEART DISEASE OF NATIVE CORONARY ARTERY WITH UNSTABLE ANGINA PECTORIS: ICD-10-CM

## 2017-12-19 DIAGNOSIS — I71.01 DISSECTION OF THORACIC AORTA: ICD-10-CM

## 2017-12-19 DIAGNOSIS — R07.9 CHEST PAIN, UNSPECIFIED: ICD-10-CM

## 2017-12-19 LAB
ALBUMIN SERPL ELPH-MCNC: 4.1 G/DL — SIGNIFICANT CHANGE UP (ref 3.3–5)
ALP SERPL-CCNC: 88 U/L — SIGNIFICANT CHANGE UP (ref 40–120)
ALT FLD-CCNC: 42 U/L — SIGNIFICANT CHANGE UP (ref 12–78)
ANION GAP SERPL CALC-SCNC: 5 MMOL/L — SIGNIFICANT CHANGE UP (ref 5–17)
APTT BLD: 28.1 SEC — SIGNIFICANT CHANGE UP (ref 27.5–37.4)
AST SERPL-CCNC: 31 U/L — SIGNIFICANT CHANGE UP (ref 15–37)
BILIRUB SERPL-MCNC: 0.9 MG/DL — SIGNIFICANT CHANGE UP (ref 0.2–1.2)
BUN SERPL-MCNC: 25 MG/DL — HIGH (ref 7–23)
CALCIUM SERPL-MCNC: 8.9 MG/DL — SIGNIFICANT CHANGE UP (ref 8.5–10.1)
CHLORIDE SERPL-SCNC: 107 MMOL/L — SIGNIFICANT CHANGE UP (ref 96–108)
CK MB CFR SERPL CALC: 0.9 NG/ML — SIGNIFICANT CHANGE UP (ref 0–3.6)
CO2 SERPL-SCNC: 28 MMOL/L — SIGNIFICANT CHANGE UP (ref 22–31)
CREAT SERPL-MCNC: 1.3 MG/DL — SIGNIFICANT CHANGE UP (ref 0.5–1.3)
GLUCOSE SERPL-MCNC: 157 MG/DL — HIGH (ref 70–99)
HCT VFR BLD CALC: 44.5 % — SIGNIFICANT CHANGE UP (ref 39–50)
HGB BLD-MCNC: 14.3 G/DL — SIGNIFICANT CHANGE UP (ref 13–17)
INR BLD: 1.06 RATIO — SIGNIFICANT CHANGE UP (ref 0.88–1.16)
MCHC RBC-ENTMCNC: 29.9 PG — SIGNIFICANT CHANGE UP (ref 27–34)
MCHC RBC-ENTMCNC: 32.2 GM/DL — SIGNIFICANT CHANGE UP (ref 32–36)
MCV RBC AUTO: 93 FL — SIGNIFICANT CHANGE UP (ref 80–100)
PLATELET # BLD AUTO: 149 K/UL — LOW (ref 150–400)
POTASSIUM SERPL-MCNC: 4.1 MMOL/L — SIGNIFICANT CHANGE UP (ref 3.5–5.3)
POTASSIUM SERPL-SCNC: 4.1 MMOL/L — SIGNIFICANT CHANGE UP (ref 3.5–5.3)
PROT SERPL-MCNC: 8 G/DL — SIGNIFICANT CHANGE UP (ref 6–8.3)
PROTHROM AB SERPL-ACNC: 11.5 SEC — SIGNIFICANT CHANGE UP (ref 9.8–12.7)
RBC # BLD: 4.79 M/UL — SIGNIFICANT CHANGE UP (ref 4.2–5.8)
RBC # FLD: 14 % — SIGNIFICANT CHANGE UP (ref 10.3–14.5)
SODIUM SERPL-SCNC: 140 MMOL/L — SIGNIFICANT CHANGE UP (ref 135–145)
TROPONIN I SERPL-MCNC: <.015 NG/ML — SIGNIFICANT CHANGE UP (ref 0.01–0.04)
TROPONIN T SERPL-MCNC: <0.01 NG/ML — SIGNIFICANT CHANGE UP (ref 0–0.06)
WBC # BLD: 5.4 K/UL — SIGNIFICANT CHANGE UP (ref 3.8–10.5)
WBC # FLD AUTO: 5.4 K/UL — SIGNIFICANT CHANGE UP (ref 3.8–10.5)

## 2017-12-19 PROCEDURE — 99285 EMERGENCY DEPT VISIT HI MDM: CPT

## 2017-12-19 PROCEDURE — 71046 X-RAY EXAM CHEST 2 VIEWS: CPT

## 2017-12-19 PROCEDURE — 82553 CREATINE MB FRACTION: CPT

## 2017-12-19 PROCEDURE — 99285 EMERGENCY DEPT VISIT HI MDM: CPT | Mod: 25

## 2017-12-19 PROCEDURE — 36000 PLACE NEEDLE IN VEIN: CPT

## 2017-12-19 PROCEDURE — 99222 1ST HOSP IP/OBS MODERATE 55: CPT | Mod: GC

## 2017-12-19 PROCEDURE — 85027 COMPLETE CBC AUTOMATED: CPT

## 2017-12-19 PROCEDURE — 71275 CT ANGIOGRAPHY CHEST: CPT | Mod: 26

## 2017-12-19 PROCEDURE — 84484 ASSAY OF TROPONIN QUANT: CPT

## 2017-12-19 PROCEDURE — 99223 1ST HOSP IP/OBS HIGH 75: CPT

## 2017-12-19 PROCEDURE — 71020: CPT | Mod: 26

## 2017-12-19 PROCEDURE — 80053 COMPREHEN METABOLIC PANEL: CPT

## 2017-12-19 PROCEDURE — 85379 FIBRIN DEGRADATION QUANT: CPT

## 2017-12-19 PROCEDURE — 74174 CTA ABD&PLVS W/CONTRAST: CPT | Mod: 26

## 2017-12-19 PROCEDURE — 93010 ELECTROCARDIOGRAM REPORT: CPT

## 2017-12-19 PROCEDURE — 93005 ELECTROCARDIOGRAM TRACING: CPT

## 2017-12-19 RX ORDER — SODIUM CHLORIDE 9 MG/ML
3 INJECTION INTRAMUSCULAR; INTRAVENOUS; SUBCUTANEOUS ONCE
Qty: 0 | Refills: 0 | Status: COMPLETED | OUTPATIENT
Start: 2017-12-19 | End: 2017-12-19

## 2017-12-19 RX ORDER — LOSARTAN POTASSIUM 100 MG/1
1 TABLET, FILM COATED ORAL
Qty: 0 | Refills: 0 | COMMUNITY

## 2017-12-19 RX ORDER — LABETALOL HCL 100 MG
10 TABLET ORAL ONCE
Qty: 0 | Refills: 0 | Status: DISCONTINUED | OUTPATIENT
Start: 2017-12-19 | End: 2017-12-20

## 2017-12-19 RX ORDER — METOPROLOL TARTRATE 50 MG
15 TABLET ORAL EVERY 4 HOURS
Qty: 0 | Refills: 0 | Status: DISCONTINUED | OUTPATIENT
Start: 2017-12-19 | End: 2017-12-19

## 2017-12-19 RX ORDER — NITROGLYCERIN 6.5 MG
0.4 CAPSULE, EXTENDED RELEASE ORAL ONCE
Qty: 0 | Refills: 0 | Status: COMPLETED | OUTPATIENT
Start: 2017-12-19 | End: 2017-12-19

## 2017-12-19 RX ORDER — METOPROLOL TARTRATE 50 MG
10 TABLET ORAL EVERY 4 HOURS
Qty: 0 | Refills: 0 | Status: DISCONTINUED | OUTPATIENT
Start: 2017-12-19 | End: 2017-12-20

## 2017-12-19 RX ORDER — SODIUM CHLORIDE 9 MG/ML
1000 INJECTION INTRAMUSCULAR; INTRAVENOUS; SUBCUTANEOUS ONCE
Qty: 0 | Refills: 0 | Status: COMPLETED | OUTPATIENT
Start: 2017-12-19 | End: 2017-12-19

## 2017-12-19 RX ORDER — LOSARTAN POTASSIUM 100 MG/1
50 TABLET, FILM COATED ORAL ONCE
Qty: 0 | Refills: 0 | Status: COMPLETED | OUTPATIENT
Start: 2017-12-19 | End: 2017-12-19

## 2017-12-19 RX ORDER — METOPROLOL TARTRATE 50 MG
50 TABLET ORAL ONCE
Qty: 0 | Refills: 0 | Status: COMPLETED | OUTPATIENT
Start: 2017-12-19 | End: 2017-12-19

## 2017-12-19 RX ORDER — METOPROLOL TARTRATE 50 MG
1 TABLET ORAL
Qty: 0 | Refills: 0 | COMMUNITY

## 2017-12-19 RX ORDER — ASPIRIN/CALCIUM CARB/MAGNESIUM 324 MG
325 TABLET ORAL ONCE
Qty: 0 | Refills: 0 | Status: COMPLETED | OUTPATIENT
Start: 2017-12-19 | End: 2017-12-19

## 2017-12-19 RX ORDER — ACETAMINOPHEN 500 MG
650 TABLET ORAL EVERY 6 HOURS
Qty: 0 | Refills: 0 | Status: DISCONTINUED | OUTPATIENT
Start: 2017-12-19 | End: 2017-12-22

## 2017-12-19 RX ADMIN — Medication 10 MILLIGRAM(S): at 23:41

## 2017-12-19 RX ADMIN — Medication 0.4 MILLIGRAM(S): at 10:00

## 2017-12-19 RX ADMIN — SODIUM CHLORIDE 1000 MILLILITER(S): 9 INJECTION INTRAMUSCULAR; INTRAVENOUS; SUBCUTANEOUS at 09:59

## 2017-12-19 RX ADMIN — SODIUM CHLORIDE 3 MILLILITER(S): 9 INJECTION INTRAMUSCULAR; INTRAVENOUS; SUBCUTANEOUS at 09:50

## 2017-12-19 RX ADMIN — Medication 50 MILLIGRAM(S): at 20:15

## 2017-12-19 RX ADMIN — LOSARTAN POTASSIUM 50 MILLIGRAM(S): 100 TABLET, FILM COATED ORAL at 20:15

## 2017-12-19 RX ADMIN — Medication 650 MILLIGRAM(S): at 22:34

## 2017-12-19 RX ADMIN — Medication 325 MILLIGRAM(S): at 10:00

## 2017-12-19 RX ADMIN — Medication 650 MILLIGRAM(S): at 22:14

## 2017-12-19 NOTE — H&P ADULT - PROBLEM SELECTOR PLAN 1
- found incidentally on CTA  - vasc surg consult called  - cont BP control SBP <150  - cont home metoprolol/losartan  - ?need for serial imaging?  - OK to hold plavix if indicated - found incidentally on CTA  - vasc surg consult called  - spoke with vascular surgery resident, they would like SBP to be <120. I am concerned by cerebral perfusion with such rapid reduction in blood pressure. Current SBP in the 150s. Will start nicardipine 20 TID in addition to hydralazine as recommended by MICU, and pt's home losartan + metoprolol. PRN labetalol IV pushes ordered as well.  - cont home metoprolol/losartan  - ?need for serial imaging?  - OK to hold plavix if indicated

## 2017-12-19 NOTE — CONSULT NOTE ADULT - SUBJECTIVE AND OBJECTIVE BOX
[facility] Vascular Surgery Consultation   Consultation of Dr. FREDA Esparza   Vascular Surgery Pager #[phone]    In brief, Mr. Flanagan is a 72 year old male who presents to [facility] with complaint of unremitting back pain now progressed to left sided chest pain. Originally believed to be musculoskeletal in nature, to date negative work-up. He was seen at [place] ED and concern for cardiac/vascular pathology transferred to [facility]. Work-up included CTA Chest/Abdomen/Pelvis demonstrated a focal dissection of the abdominal aorta proximal to the bifurcation. At the time of admission his main complaint is that of back pain and his chest pain has resolved. He suffered from a MI in 2001 and recently had PCI in August 2017.     Denies claudication symptoms.     10 point review of systems otherwise negative.     MEDICAL & SURGICAL HISTORY: Diverticulosis,  MI 2001, BPH, Gout, GERD, S/P coronary artery stent placement: 2001 LCx and RCA, 6/2014 &amp; 1/2015  CAD, Hypercholesteremia, HTN S/P coronary angioplasty: 8 stents  CAD (coronary artery disease): 8 stents  Ureteral stone: Insertion of right  ureteral stent - 9/2015  H/O tooth extraction  Cataract: s/p extraction bilat 2/2014      FAMILY HISTORY: Family history of CABG: mother with CABG at 81yo,     SOCIAL HISTORY: Former smoker quit +20 years ago, retired, former financier, , occasional ETOH     Home Medications:  acetaminophen 325 mg oral tablet: 2 tab(s) orally every 6 hours, As needed, mild to moderate pain 1-6 (19 Dec 2017 23:20)  allopurinol 100 mg oral tablet: 1 tab(s) orally 2 times a day (19 Dec 2017 23:20)  aspirin 81 mg oral tablet: 1 tab(s) orally once a day (19 Dec 2017 23:20)  Librax 5 mg-2.5 mg oral capsule: 1 cap(s) orally 2 times a day (19 Dec 2017 23:20)  losartan 50 mg oral tablet: 1 tab(s) orally once a day (19 Dec 2017 23:20)  metoprolol tartrate 50 mg oral tablet: 1 tab(s) orally 3 times a day (19 Dec 2017 23:20)  Plavix 75 mg oral tablet: 1 tab(s) orally once a day (19 Dec 2017 23:20)  pravastatin 40 mg oral tablet: 1 tab(s) orally once a day (at bedtime) (19 Dec 2017 23:20)    Allergies: strawberry (Hives)    Vital Signs Last 24 Hrs  T(C): 36.6 (19 Dec 2017 20:43), Max: 36.8 (19 Dec 2017 08:52)  T(F): 97.8 (19 Dec 2017 20:43), Max: 98.3 (19 Dec 2017 08:52)  HR: 64 (19 Dec 2017 20:43) (64 - 82)  BP: 186/98 (19 Dec 2017 20:43) (168/98 - 206/98)  RR: 18 (19 Dec 2017 20:43) (16 - 18)  SpO2: 95% (19 Dec 2017 20:43) (95% - 99%)    General: WN/WD NAD  Neurology: A&Ox3, nonfocal, BARRIOS x 4  Head:  Normocephalic, atraumatic  ENT:  Mucosa moist, no ulcerations  Neck:  Supple, no sinuses or palpable masses  Lymphatic:  No palpable cervical, supraclavicular, axillary or inguinal adenopathy  Respiratory: Nonlabored, no reproducible chest tenderness  CV: RRR  Abdominal: Soft, NT, ND no palpable mass  MSK: No edema, +2 peripheral pulses, FROM all 4 extremity  Incisions: intact, no erythema or drainage                            14.3   5.4   )-----------( 149      ( 19 Dec 2017 09:24 )             44.5     12-19    140  |  107  |  25<H>  ----------------------------<  157<H>  4.1   |  28  |  1.30    Ca    8.9      19 Dec 2017 09:24    TPro  8.0  /  Alb  4.1  /  TBili  0.9  /  DBili  x   /  AST  31  /  ALT  42  /  AlkPhos  88  12-19    PT/INR - ( 19 Dec 2017 16:49 )   PT: 11.5 sec;   INR: 1.06 ratio         PTT - ( 19 Dec 2017 16:49 )  PTT:28.1 sec      Radiographic Findings:     < from: CT Angio Abdomen and Pelvis w/ IV Cont (12.19.17 @ 21:26) >    EXAM:  CT ANGIO ABD PELV (W)AW IC                          EXAM:  CT ANGIO CHEST (W)AW IC                            PROCEDURE DATE:  12/19/2017            INTERPRETATION:  CLINICAL INFORMATION: Chest pain. Evaluate for aortic   aneurysm.    COMPARISON: CT chest, abdomen and pelvis 8/14/2017.    PROCEDURE:   CT Angiography of the Chest, Abdomen and Pelvis.   Gated precontrast imaging was performed through the heart followed by   gated CT Angiography of the heart with subsequent non-gated arterial   phase imaging of the chest, abdomen and pelvis.  Intravenous contrast: 90 ml Omnipaque 350. 10 ml discarded.  Oral contrast:None.  Sagittal and coronal reformats were performed as well as 3D (MIP)   reconstructions.    FINDINGS:    CHEST:     LUNGS AND LARGE AIRWAYS: Patent central airways. Mild centrilobular   emphysema. No pulmonary nodules.  PLEURA: No pleural effusion.  VESSELS: No acute intramural hematoma, aortic aneurysm or aortic   dissection. Normal caliber main pulmonary artery. Coronary artery disease   involving the right coronary artery, left anterior descending and the   circumflex artery. Left circumflex and LAD stents. Calcified and   noncalcified atheromatous disease of the aortic arch and the descending   thoracic aorta.  HEART: Heart size is normal. No pericardial effusion.  MEDIASTINUM AND ZOHREH: No lymphadenopathy.  CHEST WALL AND LOWER NECK: Within normal limits.    ABDOMEN AND PELVIS:    LIVER: Within normal limits.  BILE DUCTS: Normal caliber. Stable small common bile duct stones. Common   bile duct measures 9 mm at the pancreatic head, unchanged.  GALLBLADDER: Within normal limits.  SPLEEN: Within normal limits.  PANCREAS: Within normal limits.  ADRENALS: Within normal limits.  KIDNEYS/URETERS: Symmetric enhancement. No hydronephrosis.    BLADDER: Within normal limits.  REPRODUCTIVE ORGANS: Unremarkable prostate gland.    BOWEL: Small periampullary duodenal diverticulum. Sigmoid colon   diverticulosis without acute inflammation. No bowel obstruction. Appendix   is normal.  PERITONEUM: No ascites.  VESSELS:  No abdominal aortic aneurysm. Short segment focal dissection   flap within the posterior abdominal aorta just proximal to the   bifurcation. Patent celiac axis, SMA and KATHERINE. Atheromatous calcifications   of the aorta. Circumaortic left renal vein.  RETROPERITONEUM: No lymphadenopathy.    ABDOMINAL WALL: Within normal limits.  BONES: Multilevel spinal degenerative changes.    IMPRESSION:     No acute intramural hematoma, thoracic or abdominal aortic aneurysm.     Focal dissection flap within the distal abdominal aorta just proximal to   the bifurcation.                  CRESCENCIO MCCURDY M.D., RADIOLOGY RESIDENT  This document has been electronically signed.  KIMMY TOLEDO M.D., ATTENDING RADIOLOGIST  This document has been electronically signed. Dec 19 2017 10:57PM                < end of copied text >

## 2017-12-19 NOTE — H&P ADULT - HISTORY OF PRESENT ILLNESS
72 year old male with HTN, hyperlipidemia, BPH, known CAD with multiple PTCA/stents of LCx/RCA/LAD who presented to [facility] ED 7/31/17 complaining of SOB and chest pressure. Patient describes his symptoms as SOB over the past 1 week with varying level of activity, however, yesterday he developed sudden onset of left sided chest pressure radiating to his left arm while sitting watching television which prompted him to present to the ED. Patient R/O MI with negative troponins.   In light of patients CAD history and symptoms there is high suspicion for CAD progression. Patient is now transferred to [facility] 8/1/17  for a cardiac catheterization with possible PTCA/stent.   Patient without complaints on arrival to [facility]. 72M c hx HTN, HLD, BPH, CAD s/p stents (LCx/RCA/LAD), GERD, transfer from [place] for left sided chest pain. 72M c hx HTN, HLD, BPH, CAD s/p 9x stents (most recently Aug '17), CKD, transfer from [place] for left sided chest pain.    Pt states his chest pain is similar to his previous chest pain. States that the chest pain started yesterday night, and has been persistent since then. Denies radiation anywhere. It is not worse with exertion or breathing. Denies associated palpitations. Also reports left back pain that started 2 weeks ago, worse with lying down or sitting, but improved with ambulation, reproducible on palpation. Denies fevers, chills, URI symptoms, leg swelling, SOB, N/V, diarrhea.    VS: Tm 97.8, P 64, /98, R 18, 95% RA  In the ED, received losartan 50, metoprolol 50.

## 2017-12-19 NOTE — H&P ADULT - NSHPSOCIALHISTORY_GEN_ALL_CORE
Social History:    Marital Status: ( x ) , (  ) Single, (  ) , (  ) , (  )   # of Children: 2  Lives with: (  ) alone, (  ) children, ( x ) spouse, (  ) parents, (  ) siblings, (  ) friends, (  ) other:   Occupation: finance    Substance Use/Illicit Drugs: (  ) never used, (  ) other:   Tobacco Usage: (  ) never smoked, ( x ) former smoker, (  ) current smoker, and Total Pack-Years: 25  Last Alcohol Usage/Frequency/Amount:

## 2017-12-19 NOTE — H&P ADULT - PROBLEM SELECTOR PLAN 3
- in setting of abdominal aortic dissection, will need better BP control  - spoke with CCU and ICU for eval antihypertensive gtt  - cont IV labetalol pushes PRN for now - in setting of abdominal aortic dissection, will need better BP control  - spoke with CCU and ICU for eval for antihypertensive gtt for better control.   - cont losartan + metoprolol + hydralazine + nifedpine + prn labetalol IV

## 2017-12-19 NOTE — H&P ADULT - ASSESSMENT
72M c hx HTN, HLD, BPH, CAD s/p 9x stents (most recently Aug '17), CKD, transfer from [place], found to have aortic dissection flap, r/o MI. 72M c hx HTN, HLD, BPH, CAD s/p 9x stents (most recently Aug '17), CKD, transfer from [place], found to have abdominal aortic dissection flap, r/o MI.

## 2017-12-19 NOTE — H&P ADULT - ATTENDING COMMENTS
Pt signed out to NP service. Time spent 70 minutes on total encounter. Dr. Kramer to assume care in AM.

## 2017-12-19 NOTE — ED ADULT NURSE NOTE - OBJECTIVE STATEMENT
71 yo male presents to the ED from [facility] c/o chest pain x2days. EMS states patient came here for continuation of care because cardiologist is at [facility]. patient states he has had constant chest pain, left sided, non-radiating for 2 days. EMS states patient was given 325mg aspirin, 0.4mg nitroglycerin SL and 1 L NS at [employer]. patient is AAOx3. lung sounds clear bilaterally. cap refill <3sec. patient denies chest pain, SOB, fevers, chills, N/V/D, dizziness, cough, numbness or tingling in extremities. patient c/o back pain and HA. VSS. Sinus arrythmia with HR= 50s-60s. MD at the bedside.    Written by Jessica Perry RN.

## 2017-12-19 NOTE — H&P ADULT - PMH
BPH (benign prostatic hyperplasia)    CAD (coronary artery disease)    Diverticulosis    Enlarged aorta    GERD (gastroesophageal reflux disease)    Gout    HTN (hypertension)    Hypercholesteremia    MI, old  2001  S/P coronary artery stent placement  2001 LCx and RCA, 6/2014 & 1/2015

## 2017-12-19 NOTE — CONSULT NOTE ADULT - PROBLEM SELECTOR RECOMMENDATION 9
Blood pressure control, therapeutic goals for resolution of pain and reduction of systolic blood pressure to range of < 120 mm Hg.    -May follow-up with Dr. Esparza as an outpatient.   -Discussed with Vascular surgery fellow on call.

## 2017-12-19 NOTE — ED PROVIDER NOTE - OBJECTIVE STATEMENT
72M h/o CAD (4x stents), HTN, MI presents as transfer from [facility] for admission. He presented to OSH for non-exertional left sided chest pain, constant, non-radiating, dull with occasional sharp pain since last night, no associated with breathing. He also has had left sided back pain for several days which hurts most when he lays on that side. He's unsure if the pains are related. He describes this pain as different from his other chest pains which led to his stents. In OSH his tropx1 was negative. Denies fever, chills, SOB, N/V/D, Gu complaints.  Cardiologist Dr. Adams. Requested admit to Dr. Kramer. 72M h/o CAD (4x stents), HTN, MI presents as transfer from [facility] for admission. He presented to OSH for non-exertional left sided chest pain, constant, non-radiating, dull with occasional sharp pain since last night, no associated with breathing. He also has had left sided back pain for several days which hurts most when he lays on that side. He's unsure if the pains are related. He describes this pain as different from his other chest pains which led to his stents. In OSH his tropx1 was negative. Denies fever, chills, SOB, N/V/D, Gu complaints.  Cardiologist Dr. Adams. Accepted as a transfer for Dr. Kramer.

## 2017-12-19 NOTE — CONSULT NOTE ADULT - ASSESSMENT
Mr. Flanagan is a 72 year old male with Focal dissection flap within the distal abdominal aorta just proximal to the bifurcation. At present this patient has pain, albeit intermittent, he is without evidence of ischemic symptoms or intractable pain which would warrant an emergent intervention. Based on his risk profile and aortoiliac anatomy, close surveillance and tight blood pressure control is indicated to prevent aneurysmal degeneration or dissection propagation.

## 2017-12-19 NOTE — H&P ADULT - PROBLEM SELECTOR PLAN 2
- s/p [facility] 325  - tele  - trend CE  - TTE  - spoke with Dr. Long, who will see pt in AM  - hold a/c

## 2017-12-19 NOTE — ED PROVIDER NOTE - OBJECTIVE STATEMENT
72 year old male with a history of MI, BPH, cardiac stents x 8, HTN, high cholesterol presents with c/o intermittent left sided chest pain x "few weeks."  Pt states the pain started with left sided upper back pain.  Pt had X-ray yesterday.  Pt c/o c.p, SOB.  Cardio: Dr. IRIS Gresham.

## 2017-12-19 NOTE — ED PROVIDER NOTE - MEDICAL DECISION MAKING DETAILS
pt with strong cardiac hx with recent cath and stents 4 mos ago with intermitent chest pain x weeks.  cardiac workup

## 2017-12-19 NOTE — ED ADULT NURSE NOTE - PSH
CAD (coronary artery disease)  8 stents  Cataract  s/p extraction bilat 2/2014  H/O tooth extraction    S/P angioplasty with stent  x 3 ( 12/2001, 6/2014 & 01/2015)  S/P coronary angioplasty  8 stents  Ureteral stone  Insertion of right  ureteral stent - 9/2015

## 2017-12-19 NOTE — H&P ADULT - NSHPREVIEWOFSYSTEMS_GEN_ALL_CORE
REVIEW OF SYSTEMS:  CONSTITUTIONAL: No weakness. No fevers. No chills. No rigors. No weight loss. Good appetite.  EYES: No visual changes. No eye pain.  ENT: No hearing difficulty. No vertigo. No dysphagia. No Sinusitis/rhinorrhea.  NECK: No pain. No stiffness/rigidity.  CARDIAC: +chest pain. No palpitations.  RESPIRATORY: No cough. No SOB. No hemoptysis.  GASTROINTESTINAL: No abdominal pain. No nausea. No vomiting. No hematemesis. No diarrhea. No constipation. No melena. No hematochezia.  GENITOURINARY: No dysuria. No frequency. No hesitancy. No hematuria.  NEUROLOGICAL: No numbness/tingling. No focal weakness. No incontinence. No headache.  BACK: +back pain.  EXTREMITIES: No lower extremity edema. Full ROM.  SKIN: No rashes. No itching. No other lesions.  PSYCHIATRIC: No depression. No anxiety. No SI/HI.  ALLERGIC: No lip swelling. No hives.  All other review of systems is negative unless indicated above.  Unless indicated above, unable to assess ROS 2/2

## 2017-12-19 NOTE — ED PROVIDER NOTE - MEDICAL DECISION MAKING DETAILS
72M pw chest pain. Workup initiated in [place], will get 2nd trop, coags. Admit to Dr. Kramer. 72M pw chest pain. Workup initiated in [place], will get 2nd trop, coags. Admit to Dr. Kramer.    Sarahi Silva MD - Attending Physician: Pt here with acute on chronic CP. Transferred here for admission. Due for repeat trop now, will d/w Dr Rizvi

## 2017-12-19 NOTE — ED PROVIDER NOTE - ATTENDING CONTRIBUTION TO CARE
Sarahi Silva MD - Attending Physician: I have personally seen and examined this patient with the resident/fellow.  I have fully participated in the care of this patient. I have reviewed all pertinent clinical information, including history, physical exam, plan and the Resident/Fellow’s note and agree except as noted. See MDM

## 2017-12-19 NOTE — H&P ADULT - PROBLEM SELECTOR PROBLEM 2
Coronary artery disease involving native coronary artery of native heart with unstable angina pectoris Unstable angina

## 2017-12-19 NOTE — H&P ADULT - NSHPLABSRESULTS_GEN_ALL_CORE
Personally reviewed labs.   Personally reviewed imaging.   Personally reviewed EKG. NSR, rate 67, . <1mm NATHAN in V1-V2. No TWI. No Qwaves.                          14.3   5.4   )-----------( 149      ( 19 Dec 2017 09:24 )             44.5       12-19    140  |  107  |  25<H>  ----------------------------<  157<H>  4.1   |  28  |  1.30    Ca    8.9      19 Dec 2017 09:24    TPro  8.0  /  Alb  4.1  /  TBili  0.9  /  DBili  x   /  AST  31  /  ALT  42  /  AlkPhos  88  12-19      CARDIAC MARKERS ( 19 Dec 2017 16:49 )  x     / <0.01 ng/mL / x     / x     / x      CARDIAC MARKERS ( 19 Dec 2017 09:24 )  <.015 ng/mL / x     / x     / x     / 0.9 ng/mL        LIVER FUNCTIONS - ( 19 Dec 2017 09:24 )  Alb: 4.1 g/dL / Pro: 8.0 g/dL / ALK PHOS: 88 U/L / ALT: 42 U/L / AST: 31 U/L / GGT: x             PT/INR - ( 19 Dec 2017 16:49 )   PT: 11.5 sec;   INR: 1.06 ratio         PTT - ( 19 Dec 2017 16:49 )  PTT:28.1 sec

## 2017-12-19 NOTE — ED PROVIDER NOTE - CHPI ED SYMPTOMS NEG
no vomiting/no syncope/no fever/no nausea/no shortness of breath/no chills/no cough/no diaphoresis/no dizziness

## 2017-12-19 NOTE — H&P ADULT - NSHPPHYSICALEXAM_GEN_ALL_CORE
PHYSICAL EXAM:   GENERAL: Alert. Not confused. No acute distress. Not thin/cachectic. Not obese. Well-developed.  HEAD:  Atraumatic. Normocephalic.  EYES: EOMI. PERRLA. Normal conjunctiva/sclera.  ENT: Neck supple. No JVD. Moist oral mucosa. Not edentulous. No thrush.  LYMPH: Normal supraclavicular/cervical lymph nodes.   CARDIAC: No tachy. No luciana. Regular rhythm. RRR. S1. S2. No murmur. No rub. No distant heart sounds.  LUNG/CHEST: CTAB. BS equal bilaterally. No wheezes. No rales. No rhonchi.  ABDOMEN: Soft. No tenderness. No distension. No fluid wave. Normal bowel sounds.  BACK: No midline/vertebral tenderness. No flank tenderness.  VASCULAR: +2 b/l radial or ulnar pulses. Palpable DP pulses.  EXTREMITIES:  No clubbing. No cyanosis. No edema. Moving all 4.  NEUROLOGY: A&Ox3. Non-focal exam. Cranial nerves intact. Normal speech.  PSYCH: Normal behavior. Normal affect.  SKIN: No jaundice. No erythema. No rash/lesion.  Vascular Access:       ICU Vital Signs Last 24 Hrs  T(C): 36.6 (19 Dec 2017 20:43), Max: 36.8 (19 Dec 2017 08:52)  T(F): 97.8 (19 Dec 2017 20:43), Max: 98.3 (19 Dec 2017 08:52)  HR: 64 (19 Dec 2017 20:43) (64 - 82)  BP: 186/98 (19 Dec 2017 20:43) (168/98 - 206/98)  BP(mean): --  ABP: --  ABP(mean): --  RR: 18 (19 Dec 2017 20:43) (16 - 18)  SpO2: 95% (19 Dec 2017 20:43) (95% - 99%)      I&O's Summary    19 Dec 2017 07:01  -  19 Dec 2017 23:21  --------------------------------------------------------  IN: 120 mL / OUT: 0 mL / NET: 120 mL

## 2017-12-20 DIAGNOSIS — I20.0 UNSTABLE ANGINA: ICD-10-CM

## 2017-12-20 DIAGNOSIS — I16.1 HYPERTENSIVE EMERGENCY: ICD-10-CM

## 2017-12-20 LAB
ALBUMIN SERPL ELPH-MCNC: 4 G/DL — SIGNIFICANT CHANGE UP (ref 3.3–5)
ALP SERPL-CCNC: 76 U/L — SIGNIFICANT CHANGE UP (ref 40–120)
ALT FLD-CCNC: 29 U/L RC — SIGNIFICANT CHANGE UP (ref 10–45)
ANION GAP SERPL CALC-SCNC: 17 MMOL/L — SIGNIFICANT CHANGE UP (ref 5–17)
APTT BLD: 26.7 SEC — LOW (ref 27.5–37.4)
AST SERPL-CCNC: 26 U/L — SIGNIFICANT CHANGE UP (ref 10–40)
BASOPHILS # BLD AUTO: 0 K/UL — SIGNIFICANT CHANGE UP (ref 0–0.2)
BASOPHILS NFR BLD AUTO: 0.2 % — SIGNIFICANT CHANGE UP (ref 0–2)
BILIRUB SERPL-MCNC: 1.2 MG/DL — SIGNIFICANT CHANGE UP (ref 0.2–1.2)
BUN SERPL-MCNC: 19 MG/DL — SIGNIFICANT CHANGE UP (ref 7–23)
CALCIUM SERPL-MCNC: 9.4 MG/DL — SIGNIFICANT CHANGE UP (ref 8.4–10.5)
CHLORIDE SERPL-SCNC: 102 MMOL/L — SIGNIFICANT CHANGE UP (ref 96–108)
CK MB CFR SERPL CALC: 1.5 NG/ML — SIGNIFICANT CHANGE UP (ref 0–6.7)
CK MB CFR SERPL CALC: 1.6 NG/ML — SIGNIFICANT CHANGE UP (ref 0–6.7)
CK SERPL-CCNC: 63 U/L — SIGNIFICANT CHANGE UP (ref 30–200)
CK SERPL-CCNC: 65 U/L — SIGNIFICANT CHANGE UP (ref 30–200)
CO2 SERPL-SCNC: 21 MMOL/L — LOW (ref 22–31)
CREAT SERPL-MCNC: 1.04 MG/DL — SIGNIFICANT CHANGE UP (ref 0.5–1.3)
EOSINOPHIL # BLD AUTO: 0.1 K/UL — SIGNIFICANT CHANGE UP (ref 0–0.5)
EOSINOPHIL NFR BLD AUTO: 1.6 % — SIGNIFICANT CHANGE UP (ref 0–6)
GLUCOSE BLDC GLUCOMTR-MCNC: 101 MG/DL — HIGH (ref 70–99)
GLUCOSE SERPL-MCNC: 95 MG/DL — SIGNIFICANT CHANGE UP (ref 70–99)
HCT VFR BLD CALC: 42.2 % — SIGNIFICANT CHANGE UP (ref 39–50)
HGB BLD-MCNC: 14.5 G/DL — SIGNIFICANT CHANGE UP (ref 13–17)
INR BLD: 1.09 RATIO — SIGNIFICANT CHANGE UP (ref 0.88–1.16)
LYMPHOCYTES # BLD AUTO: 1.1 K/UL — SIGNIFICANT CHANGE UP (ref 1–3.3)
LYMPHOCYTES # BLD AUTO: 13.9 % — SIGNIFICANT CHANGE UP (ref 13–44)
MAGNESIUM SERPL-MCNC: 2.1 MG/DL — SIGNIFICANT CHANGE UP (ref 1.6–2.6)
MCHC RBC-ENTMCNC: 31.7 PG — SIGNIFICANT CHANGE UP (ref 27–34)
MCHC RBC-ENTMCNC: 34.3 GM/DL — SIGNIFICANT CHANGE UP (ref 32–36)
MCV RBC AUTO: 92.5 FL — SIGNIFICANT CHANGE UP (ref 80–100)
MONOCYTES # BLD AUTO: 0.6 K/UL — SIGNIFICANT CHANGE UP (ref 0–0.9)
MONOCYTES NFR BLD AUTO: 7.4 % — SIGNIFICANT CHANGE UP (ref 2–14)
NEUTROPHILS # BLD AUTO: 5.9 K/UL — SIGNIFICANT CHANGE UP (ref 1.8–7.4)
NEUTROPHILS NFR BLD AUTO: 76.8 % — SIGNIFICANT CHANGE UP (ref 43–77)
PHOSPHATE SERPL-MCNC: 2.7 MG/DL — SIGNIFICANT CHANGE UP (ref 2.5–4.5)
PLATELET # BLD AUTO: 133 K/UL — LOW (ref 150–400)
POTASSIUM SERPL-MCNC: 3.9 MMOL/L — SIGNIFICANT CHANGE UP (ref 3.5–5.3)
POTASSIUM SERPL-SCNC: 3.9 MMOL/L — SIGNIFICANT CHANGE UP (ref 3.5–5.3)
PROT SERPL-MCNC: 7.1 G/DL — SIGNIFICANT CHANGE UP (ref 6–8.3)
PROTHROM AB SERPL-ACNC: 11.8 SEC — SIGNIFICANT CHANGE UP (ref 9.8–12.7)
RBC # BLD: 4.56 M/UL — SIGNIFICANT CHANGE UP (ref 4.2–5.8)
RBC # FLD: 13.4 % — SIGNIFICANT CHANGE UP (ref 10.3–14.5)
SODIUM SERPL-SCNC: 140 MMOL/L — SIGNIFICANT CHANGE UP (ref 135–145)
TROPONIN T SERPL-MCNC: <0.01 NG/ML — SIGNIFICANT CHANGE UP (ref 0–0.06)
TROPONIN T SERPL-MCNC: <0.01 NG/ML — SIGNIFICANT CHANGE UP (ref 0–0.06)
WBC # BLD: 7.7 K/UL — SIGNIFICANT CHANGE UP (ref 3.8–10.5)
WBC # FLD AUTO: 7.7 K/UL — SIGNIFICANT CHANGE UP (ref 3.8–10.5)

## 2017-12-20 PROCEDURE — 93010 ELECTROCARDIOGRAM REPORT: CPT

## 2017-12-20 RX ORDER — LABETALOL HCL 100 MG
300 TABLET ORAL THREE TIMES A DAY
Qty: 0 | Refills: 0 | Status: DISCONTINUED | OUTPATIENT
Start: 2017-12-20 | End: 2017-12-20

## 2017-12-20 RX ORDER — CLOPIDOGREL BISULFATE 75 MG/1
75 TABLET, FILM COATED ORAL DAILY
Qty: 0 | Refills: 0 | Status: DISCONTINUED | OUTPATIENT
Start: 2017-12-20 | End: 2017-12-22

## 2017-12-20 RX ORDER — HYDRALAZINE HCL 50 MG
25 TABLET ORAL EVERY 6 HOURS
Qty: 0 | Refills: 0 | Status: DISCONTINUED | OUTPATIENT
Start: 2017-12-20 | End: 2017-12-20

## 2017-12-20 RX ORDER — METOPROLOL TARTRATE 50 MG
50 TABLET ORAL THREE TIMES A DAY
Qty: 0 | Refills: 0 | Status: DISCONTINUED | OUTPATIENT
Start: 2017-12-20 | End: 2017-12-20

## 2017-12-20 RX ORDER — LABETALOL HCL 100 MG
10 TABLET ORAL ONCE
Qty: 0 | Refills: 0 | Status: COMPLETED | OUTPATIENT
Start: 2017-12-20 | End: 2017-12-20

## 2017-12-20 RX ORDER — ASPIRIN/CALCIUM CARB/MAGNESIUM 324 MG
81 TABLET ORAL DAILY
Qty: 0 | Refills: 0 | Status: DISCONTINUED | OUTPATIENT
Start: 2017-12-20 | End: 2017-12-22

## 2017-12-20 RX ORDER — NICARDIPINE HYDROCHLORIDE 30 MG/1
20 CAPSULE, EXTENDED RELEASE ORAL THREE TIMES A DAY
Qty: 0 | Refills: 0 | Status: DISCONTINUED | OUTPATIENT
Start: 2017-12-20 | End: 2017-12-22

## 2017-12-20 RX ORDER — PSYLLIUM SEED (WITH DEXTROSE)
1 POWDER (GRAM) ORAL
Qty: 0 | Refills: 0 | Status: DISCONTINUED | OUTPATIENT
Start: 2017-12-20 | End: 2017-12-22

## 2017-12-20 RX ORDER — ATORVASTATIN CALCIUM 80 MG/1
10 TABLET, FILM COATED ORAL AT BEDTIME
Qty: 0 | Refills: 0 | Status: DISCONTINUED | OUTPATIENT
Start: 2017-12-20 | End: 2017-12-20

## 2017-12-20 RX ORDER — HYDRALAZINE HCL 50 MG
10 TABLET ORAL EVERY 6 HOURS
Qty: 0 | Refills: 0 | Status: DISCONTINUED | OUTPATIENT
Start: 2017-12-20 | End: 2017-12-20

## 2017-12-20 RX ORDER — LOSARTAN POTASSIUM 100 MG/1
50 TABLET, FILM COATED ORAL DAILY
Qty: 0 | Refills: 0 | Status: DISCONTINUED | OUTPATIENT
Start: 2017-12-20 | End: 2017-12-21

## 2017-12-20 RX ORDER — LABETALOL HCL 100 MG
200 TABLET ORAL EVERY 8 HOURS
Qty: 0 | Refills: 0 | Status: DISCONTINUED | OUTPATIENT
Start: 2017-12-20 | End: 2017-12-21

## 2017-12-20 RX ORDER — ALLOPURINOL 300 MG
100 TABLET ORAL
Qty: 0 | Refills: 0 | Status: DISCONTINUED | OUTPATIENT
Start: 2017-12-20 | End: 2017-12-22

## 2017-12-20 RX ORDER — HYDRALAZINE HCL 50 MG
10 TABLET ORAL ONCE
Qty: 0 | Refills: 0 | Status: COMPLETED | OUTPATIENT
Start: 2017-12-20 | End: 2017-12-20

## 2017-12-20 RX ORDER — LABETALOL HCL 100 MG
10 TABLET ORAL
Qty: 0 | Refills: 0 | Status: DISCONTINUED | OUTPATIENT
Start: 2017-12-20 | End: 2017-12-20

## 2017-12-20 RX ADMIN — NICARDIPINE HYDROCHLORIDE 20 MILLIGRAM(S): 30 CAPSULE, EXTENDED RELEASE ORAL at 06:00

## 2017-12-20 RX ADMIN — Medication 200 MILLIGRAM(S): at 21:42

## 2017-12-20 RX ADMIN — Medication 40 MILLIGRAM(S): at 07:12

## 2017-12-20 RX ADMIN — Medication 40 MILLIGRAM(S): at 21:43

## 2017-12-20 RX ADMIN — CLOPIDOGREL BISULFATE 75 MILLIGRAM(S): 75 TABLET, FILM COATED ORAL at 13:09

## 2017-12-20 RX ADMIN — LOSARTAN POTASSIUM 50 MILLIGRAM(S): 100 TABLET, FILM COATED ORAL at 05:00

## 2017-12-20 RX ADMIN — Medication 1 PACKET(S): at 21:42

## 2017-12-20 RX ADMIN — NICARDIPINE HYDROCHLORIDE 20 MILLIGRAM(S): 30 CAPSULE, EXTENDED RELEASE ORAL at 16:07

## 2017-12-20 RX ADMIN — Medication 10 MILLIGRAM(S): at 00:24

## 2017-12-20 RX ADMIN — Medication 10 MILLIGRAM(S): at 02:48

## 2017-12-20 RX ADMIN — Medication 10 MILLIGRAM(S): at 00:21

## 2017-12-20 RX ADMIN — Medication 100 MILLIGRAM(S): at 18:11

## 2017-12-20 RX ADMIN — Medication 300 MILLIGRAM(S): at 09:39

## 2017-12-20 RX ADMIN — Medication 50 MILLIGRAM(S): at 05:00

## 2017-12-20 RX ADMIN — Medication 10 MILLIGRAM(S): at 04:59

## 2017-12-20 RX ADMIN — NICARDIPINE HYDROCHLORIDE 20 MILLIGRAM(S): 30 CAPSULE, EXTENDED RELEASE ORAL at 21:42

## 2017-12-20 RX ADMIN — Medication 81 MILLIGRAM(S): at 13:09

## 2017-12-20 RX ADMIN — Medication 100 MILLIGRAM(S): at 09:40

## 2017-12-20 NOTE — CHART NOTE - NSCHARTNOTEFT_GEN_A_CORE
RRT called for sbp >200. On arrival, pt appears comfortable without chest pain. On chart review, pt presented to L sided chest pain, similar to his previous chest pain. CTA here revealed focal dissection flap within the distal abdominal aorta just proximal to   the bifurcation. VSS: HR in the 60s, satting well on RA. Pt was given Labetalol 10IV with improvement of Bp to the 170s. Physical exam remains unremarkable. Pt appears comfortable when RRT ended.      Plan:  -Will give additional Labetalol 10 and Hydralazine 10 to keep sbp <160  -MICU consulted by Dr Mattson who evaluated the patient. MICU will follow      AMY Holland  #[id number]

## 2017-12-20 NOTE — CONSULT NOTE ADULT - ASSESSMENT
1. back pain and left-sided chest pain nonischemic  2.  Distal aortic dissection  3. Severe hypertension  now under better control  4. History of CAD and prior stents no active angina    Recommendations  Discussed the case with Jaja Hoff NP  Blood pressure regimen changedto labetalol 300 3 times a day, stop metoprolol,, continue nifedipine  and continue Cozaar  No further cardiac workup needed  When blood pressure under good control and patient no longer has back pain discharge home.

## 2017-12-20 NOTE — CONSULT NOTE ADULT - SUBJECTIVE AND OBJECTIVE BOX
CHIEF COMPLAINT:  back pain and left-sided chest pain  HISTORY OF PRESENT ILLNESS:  HPI:  72M c hx HTN, HLD, BPH, CAD s/p 9x stents (most recently Aug '17), CKD, transfer from [place] for left sided chest pain.    Pt states his chest pain is similar to his previous chest pain. States that the chest pain started yesterday night, and has been persistent since then. Denies radiation anywhere. It is not worse with exertion or breathing. Denies associated palpitations. Also reports left back pain that started 2 weeks ago, worse with lying down or sitting, but improved with ambulation, reproducible on palpation. Denies fevers, chills, URI symptoms, leg swelling, SOB, N/V, diarrhea.    VS: Tm 97.8, P 64, /98, R 18, 95% RA  In the ED, received losartan 50, metoprolol 50. (19 Dec 2017 22:35)    cT of the chest and abdomen noted below revealed a distal abdominal aortic aneurysm flap. No thoracic aneurysm  Last catheterization for atypical chest ppain in August 2017  normal LV function,, patent stents, distal circumflex disease successfully stented  Present pain  is different than his prior cardiac pain    PAST MEDICAL & SURGICAL HISTORY:  Diverticulosis  MI (myocardial infarction)  Diverticulosis  BPH (benign prostatic hyperplasia)  Enlarged aorta  Gout  GERD (gastroesophageal reflux disease)  S/P coronary artery stent placement: 2001 LCx and RCA, 6/2014 &amp; 1/2015  MI, old: 2001  CAD (coronary artery disease)  Hypercholesteremia  HTN (hypertension)  S/P coronary angioplasty: 8 stents  CAD (coronary artery disease): 8 stents  Ureteral stone: Insertion of right  ureteral stent - 9/2015  S/P angioplasty with stent: x 3 ( 12/2001, 6/2014 &amp; 01/2015)  H/O tooth extraction  Cataract: s/p extraction bilat 2/2014          MEDICATIONS:  aspirin enteric coated 81 milliGRAM(s) Oral daily  clopidogrel Tablet 75 milliGRAM(s) Oral daily  labetalol 200 milliGRAM(s) Oral every 8 hours  losartan 50 milliGRAM(s) Oral daily  niCARdipine 20 milliGRAM(s) Oral three times a day        acetaminophen   Tablet. 650 milliGRAM(s) Oral every 6 hours PRN    psyllium Powder 1 Packet(s) Oral every 48 hours    allopurinol 100 milliGRAM(s) Oral two times a day after meals  pravastatin 40 milliGRAM(s) Oral at bedtime        FAMILY HISTORY:  Family history of CABG: mother with CABG at 79yo  Family history of coronary artery disease        Allergies    No Known Drug Allergies  strawberry (Hives)    Intolerances    	        PHYSICAL EXAM:  T(C): 36.8 (12-20-17 @ 14:04), Max: 36.8 (12-20-17 @ 04:44)  HR: 85 (12-20-17 @ 14:04) (62 - 90)  BP: 100/62 (12-20-17 @ 13:06) (100/62 - 211/104)  RR: 18 (12-20-17 @ 14:04) (16 - 20)  SpO2: 95% (12-20-17 @ 14:04) (95% - 98%)  Wt(kg): --  I&O's Summary    19 Dec 2017 07:01  -  20 Dec 2017 07:00  --------------------------------------------------------  IN: 120 mL / OUT: 0 mL / NET: 120 mL    20 Dec 2017 07:01  -  20 Dec 2017 18:42  --------------------------------------------------------  IN: 420 mL / OUT: 600 mL / NET: -180 mL        Appearance: Normal emotional teary-eyed no acute distress	  HEENT:   Normal oral mucosa, PERRL, EOMI	  Cardiovascular: Normal S1 S2, No JVD, No murmurs  Respiratory: Lungs clear to auscultation	  Psychiatry: A & O x 3, Mood & affect appropriate  Gastrointestinal:  Soft, Non-tender, + BS	no bruit  Skin: No rashes, No ecchymoses, No cyanosis	  Neurologic: Non-focal  Extremities: No clubbing, cyanosis or edema  Vascular: Peripheral pulses palpable 2+ bilaterally    TELEMETRY: 	    ECG:    < from: 12 Lead ECG (12.19.17 @ 16:04) >  Diagnosis Line NORMAL SINUS RHYTHM WITH SINUS ARRHYTHMIA  NORMAL ECG    	  RADIOLOGY:  CT abdomen	  < from: CT Angio Chest w/ IV Cont (12.19.17 @ 21:26) >  IMPRESSION:     No acute intramural hematoma, thoracic or abdominal aortic aneurysm.     Focal dissection flap within the distal abdominal aorta just proximal to   the bifurcation.    cardiac cath   < from: Cardiac Cath Lab - Adult (08.01.17 @ 10:54) >  VENTRICLES: There were no left ventricular global or regional wall motion  abnormalities. EF estimated was 60 %.  VALVES: MITRAL VALVE: The mitral valve exhibited no regurgitation.  CORONARY VESSELS: The coronary circulation is right dominant.  LM:   --  LM: Normal.  LAD:   --  LAD: Angiography showed minor luminal irregularities with no  flow limiting lesions. Patent stents  --  D1: Angiography showed minor luminal irregularities with no flow  limiting lesions. Patent stents  CX:   --  Circumflex: Angiography showed minor luminal irregularities with  no flow limiting lesions.  --  Distal circumflex: There was a 90 % stenosis. In a second lesion, there  was a 90 % stenosis.  RCA:   --  RCA: Angiography showed minor luminal irregularities with no  flow limiting lesions. Patent stents  COMPLICATIONS: There were no complications.  DIAGNOSTIC IMPRESSIONS: Patient has preserved LV function, patent stents in  the RCA, LAD and proximal Cx with new lesion in the proximal portion of  distal x and old diseases more distallly in Cx.  DIAGNOSTIC RECOMMENDATIONS: PTCI of distal circumflex  INTERVENTIONAL IMPRESSIONS: PTCI of distal cx successfully performed as  described including PTCA balloon angioplasy of the very distal cx lesion  sindy has been present since 2014  INTERVENTIONAL RECOMMENDATIONS: ASA, plavix risk factoe modificiation  Prepared and signed by  Kraig Long M.D.    < end of copied text >  	  LABS:	 	    CARDIAC MARKERS:  Troponin T, Serum: <0.01 ng/mL (12-20 @ 02:55)  Troponin T, Serum: <0.01 ng/mL (12-20 @ 02:19)  Troponin T, Serum: <0.01 ng/mL (12-19 @ 16:49)  Troponin I, Serum: <.015 ng/mL (12-19 @ 09:24)                                  14.5   7.7   )-----------( 133      ( 20 Dec 2017 02:56 )             42.2     12-20    140  |  102  |  19  ----------------------------<  95  3.9   |  21<L>  |  1.04    Ca    9.4      20 Dec 2017 02:19  Phos  2.7     12-20  Mg     2.1     12-20    TPro  7.1  /  Alb  4.0  /  TBili  1.2  /  DBili  x   /  AST  26  /  ALT  29  /  AlkPhos  76  12-20    proBNP:   Lipid Profile:   HgA1c:   TSH:

## 2017-12-20 NOTE — CHART NOTE - NSCHARTNOTEFT_GEN_A_CORE
Notified by RN for pt with uncontrolled blood pressure sys > 200. Pt was transferred from [facility] c/o CP found to have abdominal aortic dissection. Pt treated with 10mg IV metoprolol on the floor with systolic maintaining >200. RRT called at this time. RRT team ordered 10 IV labetalol with BP briefly responding sys 170s, RRT ended and MICU evaluated patient. MICU recommending 10mg IV labetalol and 10mg IV lopressor and to keep BP <160, if pts BP remains unstable and does not respond to IV medication they will transfer to unit for further BP control. Case discussed with covering night Hospitalist Dr. Mattson who was agreeable to MICUs plan, IV labetolol 10mg IVP ordered for sys BP > 150 Q2h.  Case discussed with covering attending Dr. Kramer who also agrees with current plan. BP to be monitored frequently on the unit until stabilized with IV medication. Eventual transition to oral antihypertensives. Dr. Long (cardiology) will assess pt in AM as per Dr. Mattson. Repeat BP 30mins s/p labetalol and hydralazine 152 / 86, pt remains asymptomatic at this time.     Jose Hernandez PA-C  Department of Medicine  [id number] Notified by RN for pt with uncontrolled blood pressure sys > 200. Pt was transferred from [facility] c/o CP found to have abdominal aortic dissection. Pt treated with 10mg IV lopressor on the floor with systolic maintaining >200. RRT called at this time. RRT team ordered 10 IV labetalol with BP briefly responding sys 170s, RRT ended and MICU evaluated patient. MICU recommending 10mg IV labetalol and 10mg IV hydralazine and to keep BP <160, if pts BP remains unstable and does not respond to IV medication they will transfer to unit for further BP control. Case discussed with covering night Hospitalist Dr. Mattson who was agreeable to MICUs plan, IV labetolol 10mg IVP ordered for sys BP > 150 Q2h.  Case discussed with covering attending Dr. Kramer who also agrees with current plan. BP to be monitored frequently on the unit until stabilized with IV medication. Eventual transition to oral antihypertensives. Dr. Long (cardiology) will assess pt in AM as per Dr. Mattson. Repeat BP 30mins s/p labetalol and hydralazine 152 / 86, pt remains asymptomatic at this time.     Jose Hernandez PA-C  Department of Medicine  [id number]

## 2017-12-21 LAB
ANION GAP SERPL CALC-SCNC: 14 MMOL/L — SIGNIFICANT CHANGE UP (ref 5–17)
ANION GAP SERPL CALC-SCNC: 16 MMOL/L — SIGNIFICANT CHANGE UP (ref 5–17)
BUN SERPL-MCNC: 37 MG/DL — HIGH (ref 7–23)
BUN SERPL-MCNC: 40 MG/DL — HIGH (ref 7–23)
CALCIUM SERPL-MCNC: 9.2 MG/DL — SIGNIFICANT CHANGE UP (ref 8.4–10.5)
CALCIUM SERPL-MCNC: 9.4 MG/DL — SIGNIFICANT CHANGE UP (ref 8.4–10.5)
CHLORIDE SERPL-SCNC: 100 MMOL/L — SIGNIFICANT CHANGE UP (ref 96–108)
CHLORIDE SERPL-SCNC: 102 MMOL/L — SIGNIFICANT CHANGE UP (ref 96–108)
CO2 SERPL-SCNC: 22 MMOL/L — SIGNIFICANT CHANGE UP (ref 22–31)
CO2 SERPL-SCNC: 24 MMOL/L — SIGNIFICANT CHANGE UP (ref 22–31)
CREAT SERPL-MCNC: 1.98 MG/DL — HIGH (ref 0.5–1.3)
CREAT SERPL-MCNC: 2.1 MG/DL — HIGH (ref 0.5–1.3)
GLUCOSE SERPL-MCNC: 114 MG/DL — HIGH (ref 70–99)
GLUCOSE SERPL-MCNC: 145 MG/DL — HIGH (ref 70–99)
HCT VFR BLD CALC: 40.7 % — SIGNIFICANT CHANGE UP (ref 39–50)
HGB BLD-MCNC: 13.2 G/DL — SIGNIFICANT CHANGE UP (ref 13–17)
MCHC RBC-ENTMCNC: 29.9 PG — SIGNIFICANT CHANGE UP (ref 27–34)
MCHC RBC-ENTMCNC: 32.4 GM/DL — SIGNIFICANT CHANGE UP (ref 32–36)
MCV RBC AUTO: 92.1 FL — SIGNIFICANT CHANGE UP (ref 80–100)
PLATELET # BLD AUTO: 139 K/UL — LOW (ref 150–400)
POTASSIUM SERPL-MCNC: 3.9 MMOL/L — SIGNIFICANT CHANGE UP (ref 3.5–5.3)
POTASSIUM SERPL-MCNC: 4.2 MMOL/L — SIGNIFICANT CHANGE UP (ref 3.5–5.3)
POTASSIUM SERPL-SCNC: 3.9 MMOL/L — SIGNIFICANT CHANGE UP (ref 3.5–5.3)
POTASSIUM SERPL-SCNC: 4.2 MMOL/L — SIGNIFICANT CHANGE UP (ref 3.5–5.3)
RBC # BLD: 4.42 M/UL — SIGNIFICANT CHANGE UP (ref 4.2–5.8)
RBC # FLD: 14.8 % — HIGH (ref 10.3–14.5)
SODIUM SERPL-SCNC: 138 MMOL/L — SIGNIFICANT CHANGE UP (ref 135–145)
SODIUM SERPL-SCNC: 140 MMOL/L — SIGNIFICANT CHANGE UP (ref 135–145)
WBC # BLD: 9.15 K/UL — SIGNIFICANT CHANGE UP (ref 3.8–10.5)
WBC # FLD AUTO: 9.15 K/UL — SIGNIFICANT CHANGE UP (ref 3.8–10.5)

## 2017-12-21 PROCEDURE — 93306 TTE W/DOPPLER COMPLETE: CPT | Mod: 26

## 2017-12-21 RX ORDER — LABETALOL HCL 100 MG
300 TABLET ORAL EVERY 8 HOURS
Qty: 0 | Refills: 0 | Status: DISCONTINUED | OUTPATIENT
Start: 2017-12-21 | End: 2017-12-22

## 2017-12-21 RX ORDER — ALPRAZOLAM 0.25 MG
0.25 TABLET ORAL
Qty: 0 | Refills: 0 | Status: DISCONTINUED | OUTPATIENT
Start: 2017-12-21 | End: 2017-12-22

## 2017-12-21 RX ORDER — LOSARTAN POTASSIUM 100 MG/1
25 TABLET, FILM COATED ORAL DAILY
Qty: 0 | Refills: 0 | Status: DISCONTINUED | OUTPATIENT
Start: 2017-12-21 | End: 2017-12-22

## 2017-12-21 RX ORDER — SODIUM CHLORIDE 9 MG/ML
500 INJECTION INTRAMUSCULAR; INTRAVENOUS; SUBCUTANEOUS ONCE
Qty: 0 | Refills: 0 | Status: COMPLETED | OUTPATIENT
Start: 2017-12-21 | End: 2017-12-21

## 2017-12-21 RX ORDER — SODIUM CHLORIDE 9 MG/ML
1000 INJECTION, SOLUTION INTRAVENOUS
Qty: 0 | Refills: 0 | Status: DISCONTINUED | OUTPATIENT
Start: 2017-12-21 | End: 2017-12-22

## 2017-12-21 RX ADMIN — SODIUM CHLORIDE 65 MILLILITER(S): 9 INJECTION, SOLUTION INTRAVENOUS at 16:09

## 2017-12-21 RX ADMIN — Medication 100 MILLIGRAM(S): at 09:37

## 2017-12-21 RX ADMIN — Medication 300 MILLIGRAM(S): at 21:42

## 2017-12-21 RX ADMIN — LOSARTAN POTASSIUM 50 MILLIGRAM(S): 100 TABLET, FILM COATED ORAL at 06:49

## 2017-12-21 RX ADMIN — NICARDIPINE HYDROCHLORIDE 20 MILLIGRAM(S): 30 CAPSULE, EXTENDED RELEASE ORAL at 13:56

## 2017-12-21 RX ADMIN — Medication 200 MILLIGRAM(S): at 06:49

## 2017-12-21 RX ADMIN — Medication 100 MILLIGRAM(S): at 18:56

## 2017-12-21 RX ADMIN — Medication 200 MILLIGRAM(S): at 13:55

## 2017-12-21 RX ADMIN — Medication 40 MILLIGRAM(S): at 21:42

## 2017-12-21 RX ADMIN — CLOPIDOGREL BISULFATE 75 MILLIGRAM(S): 75 TABLET, FILM COATED ORAL at 12:12

## 2017-12-21 RX ADMIN — SODIUM CHLORIDE 500 MILLILITER(S): 9 INJECTION INTRAMUSCULAR; INTRAVENOUS; SUBCUTANEOUS at 13:44

## 2017-12-21 RX ADMIN — Medication 0.25 MILLIGRAM(S): at 16:08

## 2017-12-21 RX ADMIN — NICARDIPINE HYDROCHLORIDE 20 MILLIGRAM(S): 30 CAPSULE, EXTENDED RELEASE ORAL at 06:49

## 2017-12-21 RX ADMIN — NICARDIPINE HYDROCHLORIDE 20 MILLIGRAM(S): 30 CAPSULE, EXTENDED RELEASE ORAL at 21:42

## 2017-12-21 RX ADMIN — Medication 650 MILLIGRAM(S): at 13:55

## 2017-12-21 RX ADMIN — Medication 81 MILLIGRAM(S): at 12:12

## 2017-12-21 NOTE — PROGRESS NOTE ADULT - SUBJECTIVE AND OBJECTIVE BOX
_________________________________________________________________________________________  ========>>  M E D I C A L   A T T E N D I N G    F O L L O W  U P  N O T E  <<=========  -----------------------------------------------------------------------------------------------------    - Patient seen and examined by me approximately thirty minutes ago.  - In summary, patient is a 72y year old man who originally presented with chest pain due to hypertensive urgency.  - Patient today overall doing ok, comfortable, eating OK.  no chest pain. otherwise very anxious and upset, wants to go home..    ==================>> REVIEW OF SYSTEM <<=================    GEN: no fever, no chills, no pain  RESP: no SOB, no cough, no sputum  CVS: no chest pain, no palpitations, no edema  GI: no abdominal pain, no nausea, no constipation, no diarrhea  : no dysuria, no frequency, no hematuria  Neuro: no headache, no dizziness  Derm : no itching, no rash  Psych: feels very anxious    ==================>> PHYSICAL EXAM <<=================    GEN: A&O X 3 , NAD , comfortable, anxious   HEENT: NCAT, PERRL, MMM, hearing intact  Neck: supple , no JVD  CVS: S1S2 , regular , No M/R/G appreciated  PULM: CTA B/L,  no W/R/R appreciated  ABD.: soft. non tender, non distended,  bowel sounds present  Extrem: intact pulses , no edema     ==================>> MEDICATIONS <<====================    allopurinol 100 milliGRAM(s) Oral two times a day after meals  aspirin enteric coated 81 milliGRAM(s) Oral daily  clopidogrel Tablet 75 milliGRAM(s) Oral daily  labetalol 300 milliGRAM(s) Oral every 8 hours  losartan 25 milliGRAM(s) Oral daily  niCARdipine 20 milliGRAM(s) Oral three times a day  pravastatin 40 milliGRAM(s) Oral at bedtime  psyllium Powder 1 Packet(s) Oral every 48 hours  sodium chloride 0.45% 1000 milliLiter(s) IV Continuous <Continuous>    MEDICATIONS  (PRN):  acetaminophen   Tablet. 650 milliGRAM(s) Oral every 6 hours PRN Mild Pain (1 - 3)  ALPRAZolam 0.25 milliGRAM(s) Oral four times a day PRN anxiety    ==================>> VITAL SIGNS <<==================    Vital Signs Last 24 Hrs  T(C): 36.3 (12-21-17 @ 12:53)  T(F): 97.3 (12-21-17 @ 12:53), Max: 98.5 (12-21-17 @ 04:41)  HR: 76 (12-21-17 @ 16:22) (76 - 97)  BP: 124/70 (12-21-17 @ 16:22)  BP(mean): --  RR: 17 (12-21-17 @ 12:53) (16 - 20)  SpO2: 95% (12-21-17 @ 12:53) (93% - 95%)       ==================>> LAB AND IMAGING <<==================                        13.2   9.15  )-----------( 139      ( 21 Dec 2017 07:26 )             40.7     140  |  102  |  40<H>  ----------------------------<  145<H>  4.2   |  24  |  2.10<H>    Creatinine:  2.10  <<==, 1.98  <<==, 1.04  <<==, 1.30  <<==    Ca    9.4      21 Dec 2017 14:44  Phos  2.7     12-20  Mg     2.1     12-20    TPro  7.1  /  Alb  4.0  /  TBili  1.2  /  DBili  x   /  AST  26  /  ALT  29  /  AlkPhos  76  12-20    < from: CT Angio Abdomen and Pelvis w/ IV Cont (12.19.17 @ 21:26) >  IMPRESSION:   No acute intramural hematoma, thoracic or abdominal aortic aneurysm.   Focal dissection flap within the distal abdominal aorta just proximal to   the bifurcation.  < end of copied text >    < from: Transthoracic Echocardiogram (12.21.17 @ 10:31) >  Conclusions:  1. Normal left ventricular internal dimensions and wall  thicknesses.  2. Normal left ventricular systolic function. No segmental  wall motion abnormalities.  3. Normal diastolic function  4. Normal right ventricular size and function.  < end of copied text >    ___________________________________________________________________________________  ===============>>  A S S E S S M E N T   A N D   P L A N <<===============  ------------------------------------------------------------------------------------------    · Assessment		  72M c hx HTN, HLD, BPH, CAD s/p 9x stents (most recently Aug '17), CKD, transfer from [place], found to have abdominal aortic dissection flap..     Problem/Plan - 1:  ·  Problem: Aortic dissection, abdominal  - vasc surg appreciated  - BP control  - will decrease Labetalol to 200 mg TID as BP dropped too much  - monitor closely  - cardio f/u    Problem/Plan - 2:  ·  Problem: ANGEL, multifactorial, given contrast use, ARB, HTN emergency, and dehydration ( was NPO for ?~ 27 hrs per pt)  - pt given NS bollus with no improvement  - decreased Losartan ( and increased Labetalol for BP control)  - IV 1/2 NS with Bicarb ordered  - monitor vitals and labs closely    Problem/Plan - 3:  ·  Problem: chest pain likely due to hypertensive urgency, resolved  - tele  - trend CE  - TTE normal as above  - cardio f/u appreciated    Problem/Plan - 4:  ·  Problem: Hypertensive emergency, overall improved  - increased Labetalol as above   - decreased Losartan as above  - otherwise cont meds as above    Problem/Plan - 5:  ·  Problem: Anxiety  - Xanax PRN for now ( pt is more anxious for being in the hospital (but understands ans agreeable to stay)  -GI/DVT Prophylaxis.    --------------------------------------------  Case discussed with pt, family, cardio, NP, PMD, RN  Education given on plan of care, OOB  ___________________________  HRaza Kramer D.O.  Pager: [phone]
Subjective: Patient seen and examined. No new events except as noted.   Feels well anxious to go home  no cp no back pain BP well controlled  MEDICATIONS:  MEDICATIONS  (STANDING):  allopurinol 100 milliGRAM(s) Oral two times a day after meals  aspirin enteric coated 81 milliGRAM(s) Oral daily  clopidogrel Tablet 75 milliGRAM(s) Oral daily  labetalol 200 milliGRAM(s) Oral every 8 hours  losartan 50 milliGRAM(s) Oral daily  niCARdipine 20 milliGRAM(s) Oral three times a day  pravastatin 40 milliGRAM(s) Oral at bedtime  psyllium Powder 1 Packet(s) Oral every 48 hours      PHYSICAL EXAM:  T(C): 36.9 (12-21-17 @ 04:41), Max: 36.9 (12-21-17 @ 04:41)  HR: 97 (12-21-17 @ 04:41) (76 - 97)  BP: 120/74 (12-21-17 @ 04:41) (100/62 - 122/64)  RR: 20 (12-21-17 @ 04:41) (16 - 20)  SpO2: 94% (12-21-17 @ 04:41) (93% - 95%)  Wt(kg): --  I&O's Summary    20 Dec 2017 07:01  -  21 Dec 2017 07:00  --------------------------------------------------------  IN: 660 mL / OUT: 600 mL / NET: 60 mL    21 Dec 2017 07:01  -  21 Dec 2017 09:22  --------------------------------------------------------  IN: 360 mL / OUT: 0 mL / NET: 360 mL        Weight (kg): 77 (12-20 @ 15:59)    Appearance: Normal comfortable NAD	  HEENT:   Normal oral mucosa, PERRL, EOMI	  Cardiovascular: Normal S1 S2, No JVD, No murmurs ,  Respiratory: Lungs clear to auscultation, normal effort 	  Gastrointestinal:  Soft, Non-tender, + BS	  Skin: No rashes, No ecchymoses, No cyanosis, warm to touch  Musculoskeletal: Normal range of motion, normal strength  Psychiatry:  Mood & affect appropriate  Ext: No edema  Peripheral pulses palpable 2+ bilaterally      LABS:    CARDIAC MARKERS:  CARDIAC MARKERS ( 20 Dec 2017 02:55 )  x     / <0.01 ng/mL / 63 U/L / x     / 1.6 ng/mL  CARDIAC MARKERS ( 20 Dec 2017 02:19 )  x     / <0.01 ng/mL / 65 U/L / x     / 1.5 ng/mL  CARDIAC MARKERS ( 19 Dec 2017 16:49 )  x     / <0.01 ng/mL / x     / x     / x      CARDIAC MARKERS ( 19 Dec 2017 09:24 )  <.015 ng/mL / x     / x     / x     / 0.9 ng/mL                                13.2   9.15  )-----------( 139      ( 21 Dec 2017 07:26 )             40.7     12-21    138  |  100  |  37<H>  ----------------------------<  114<H>  3.9   |  22  |  1.98<H>    Ca    9.2      21 Dec 2017 07:31  Phos  2.7     12-20  Mg     2.1     12-20    TPro  7.1  /  Alb  4.0  /  TBili  1.2  /  DBili  x   /  AST  26  /  ALT  29  /  AlkPhos  76  12-20
Vascular Surgery Progress Note     S: Patient resting comfortably with some mild back pain     MEDICATIONS  (STANDING):  allopurinol 100 milliGRAM(s) Oral two times a day after meals  aspirin enteric coated 81 milliGRAM(s) Oral daily  clopidogrel Tablet 75 milliGRAM(s) Oral daily  labetalol 300 milliGRAM(s) Oral every 8 hours  losartan 25 milliGRAM(s) Oral daily  niCARdipine 20 milliGRAM(s) Oral three times a day  pravastatin 40 milliGRAM(s) Oral at bedtime  psyllium Powder 1 Packet(s) Oral every 48 hours  sodium chloride 0.45% 1000 milliLiter(s) (65 mL/Hr) IV Continuous <Continuous>    MEDICATIONS  (PRN):  acetaminophen   Tablet. 650 milliGRAM(s) Oral every 6 hours PRN Mild Pain (1 - 3)      Physical Exam:    Vital Signs Last 24 Hrs  T(C): 36.3 (21 Dec 2017 12:53), Max: 36.9 (21 Dec 2017 04:41)  T(F): 97.3 (21 Dec 2017 12:53), Max: 98.5 (21 Dec 2017 04:41)  HR: 81 (21 Dec 2017 13:56) (76 - 97)  BP: 153/76 (21 Dec 2017 13:56) (116/68 - 153/76)  BP(mean): --  RR: 17 (21 Dec 2017 12:53) (16 - 20)  SpO2: 95% (21 Dec 2017 12:53) (93% - 95%)    12-20-17 @ 07:01  -  12-21-17 @ 07:00  --------------------------------------------------------  IN: 660 mL / OUT: 600 mL / NET: 60 mL    12-21-17 @ 07:01  -  12-21-17 @ 15:41  --------------------------------------------------------  IN: 360 mL / OUT: 0 mL / NET: 360 mL        Gen: NAD, A&O  Abdominal: Soft, minimally distended, non-tender, incision C/D/I     LABS:                        13.2   9.15  )-----------( 139      ( 21 Dec 2017 07:26 )             40.7     12-21    140  |  102  |  40<H>  ----------------------------<  145<H>  4.2   |  24  |  2.10<H>    Ca    9.4      21 Dec 2017 14:44  Phos  2.7     12-20  Mg     2.1     12-20    TPro  7.1  /  Alb  4.0  /  TBili  1.2  /  DBili  x   /  AST  26  /  ALT  29  /  AlkPhos  76  12-20
_________________________________________________________________________________________  ========>>  M E D I C A L   A T T E N D I N G    F O L L O W  U P  N O T E  <<=========  -----------------------------------------------------------------------------------------------------    - Patient seen and examined by me approximately thirty minutes ago.  - In summary, patient is a 72y year old man who originally presented with chest pain due to hypertensive urgency.  - Patient today overall doing ok, comfortable, eating OK.  no chest pain. otherwise doing better today      BP was elevated overnight, now better    ==================>> REVIEW OF SYSTEM <<=================    GEN: no fever, no chills, no pain  RESP: no SOB, no cough, no sputum  CVS: no chest pain, no palpitations, no edema  GI: no abdominal pain, no nausea, no constipation, no diarrhea  : no dysuria, no frequency, no hematuria  Neuro: no headache, no dizziness  Derm : no itching, no rash    ==================>> PHYSICAL EXAM <<=================    GEN: A&O X 3 , NAD , comfortable  HEENT: NCAT, PERRL, MMM, hearing intact  Neck: supple , no JVD  CVS: S1S2 , regular , No M/R/G appreciated  PULM: CTA B/L,  no W/R/R appreciated  ABD.: soft. non tender, non distended,  bowel sounds present  Extrem: intact pulses , no edema   PSYCH : normal mood,  not anxious    ==================>> MEDICATIONS <<====================    MEDICATIONS  (STANDING):  allopurinol 100 milliGRAM(s) Oral two times a day after meals  aspirin enteric coated 81 milliGRAM(s) Oral daily  clopidogrel Tablet 75 milliGRAM(s) Oral daily  labetalol 300 milliGRAM(s) Oral every 8 hours  losartan 50 milliGRAM(s) Oral daily  niCARdipine 20 milliGRAM(s) Oral three times a day  pravastatin 40 milliGRAM(s) Oral at bedtime  psyllium Powder 1 Packet(s) Oral every 48 hours    MEDICATIONS  (PRN):  acetaminophen   Tablet. 650 milliGRAM(s) Oral every 6 hours PRN Mild Pain (1 - 3)    ==================>> VITAL SIGNS <<==================    T(C): 36.8 (12-20-17 @ 14:04), Max: 36.8 (12-20-17 @ 04:44)  HR: 85 (12-20-17 @ 14:04) (62 - 90)  BP: 100/62 (12-20-17 @ 13:06) (100/62 - 211/104)  RR: 18 (12-20-17 @ 14:04) (16 - 20)  SpO2: 95% (12-20-17 @ 14:04) (95% - 99%)    POCT Blood Glucose.: 101 mg/dL (20 Dec 2017 00:12)  I&O's Summary    19 Dec 2017 07:01  -  20 Dec 2017 07:00  --------------------------------------------------------  IN: 120 mL / OUT: 0 mL / NET: 120 mL    20 Dec 2017 07:01  -  20 Dec 2017 15:57  --------------------------------------------------------  IN: 420 mL / OUT: 600 mL / NET: -180 mL       ==================>> LAB AND IMAGING <<==================                        14.5   7.7   )-----------( 133      ( 20 Dec 2017 02:56 )             42.2        140  |  102  |  19  ----------------------------<  95  3.9   |  21<L>  |  1.04    Ca    9.4      20 Dec 2017 02:19  Phos  2.7     12-20  Mg     2.1     12-20    TPro  7.1  /  Alb  4.0  /  TBili  1.2  /  DBili  x   /  AST  26  /  ALT  29  /  AlkPhos  76  12-20    PT/INR - ( 20 Dec 2017 03:03 )   PT: 11.8 sec;   INR: 1.09 ratio    PTT - ( 20 Dec 2017 03:03 )  PTT:26.7 sec              CARDIAC MARKERS ( 20 Dec 2017 02:55 )  x     / <0.01 ng/mL / 63 U/L / x     / 1.6 ng/mL  CARDIAC MARKERS ( 20 Dec 2017 02:19 )  x     / <0.01 ng/mL / 65 U/L / x     / 1.5 ng/mL  CARDIAC MARKERS ( 19 Dec 2017 16:49 )  x     / <0.01 ng/mL / x     / x     / x      CARDIAC MARKERS ( 19 Dec 2017 09:24 )  <.015 ng/mL / x     / x     / x     / 0.9 ng/mL    < from: CT Angio Abdomen and Pelvis w/ IV Cont (12.19.17 @ 21:26) >  IMPRESSION:   No acute intramural hematoma, thoracic or abdominal aortic aneurysm.   Focal dissection flap within the distal abdominal aorta just proximal to   the bifurcation.  < end of copied text >    ___________________________________________________________________________________  ===============>>  A S S E S S M E N T   A N D   P L A N <<===============  ------------------------------------------------------------------------------------------    · Assessment		  72M c hx HTN, HLD, BPH, CAD s/p 9x stents (most recently Aug '17), CKD, transfer from [place], found to have abdominal aortic dissection flap, r/o MI.    Problem/Plan - 1:  ·  Problem: Aortic dissection, abdominal  - vasc surg appreciated  - BP control  - will decrease Labetalol to 200 mg TID as BP dropped too much  - monitor closely  - cardio f/u    Problem/Plan - 2:  ·  Problem: Unstable angina likely due to hypertensive urgency  - tele  - trend CE  - TTE  - cardio f/u    Problem/Plan - 3:  ·  Problem: Hypertensive emergency, overall improved  - decrease Labetalol as above and monitor closely  - otherwise cont meds as above    -GI/DVT Prophylaxis.    --------------------------------------------  Case discussed with pt, family, cardio, NO  Education given on plan of care, OOB  ___________________________  HRaza Kramer D.O.  Pager: [phone]

## 2017-12-21 NOTE — PROGRESS NOTE ADULT - ASSESSMENT
1. no further back pain or sob or cp  2. BP under good control  3. no evidence of ischemia    Recommend  discharge home on present BP regimend  followup with vascular and Dr. Chavis

## 2017-12-21 NOTE — PROGRESS NOTE ADULT - ASSESSMENT
72M with abdominal aorta dissection flap    -appreciate BP control  -would recommend abdominal duplex in three months and follow up with Dr. Esparza  -Call [phone] with questions

## 2017-12-22 ENCOUNTER — TRANSCRIPTION ENCOUNTER (OUTPATIENT)
Age: 72
End: 2017-12-22

## 2017-12-22 VITALS — SYSTOLIC BLOOD PRESSURE: 152 MMHG | HEART RATE: 80 BPM | DIASTOLIC BLOOD PRESSURE: 81 MMHG

## 2017-12-22 LAB
ANION GAP SERPL CALC-SCNC: 15 MMOL/L — SIGNIFICANT CHANGE UP (ref 5–17)
BUN SERPL-MCNC: 36 MG/DL — HIGH (ref 7–23)
CALCIUM SERPL-MCNC: 9.2 MG/DL — SIGNIFICANT CHANGE UP (ref 8.4–10.5)
CHLORIDE SERPL-SCNC: 106 MMOL/L — SIGNIFICANT CHANGE UP (ref 96–108)
CO2 SERPL-SCNC: 23 MMOL/L — SIGNIFICANT CHANGE UP (ref 22–31)
CREAT SERPL-MCNC: 1.5 MG/DL — HIGH (ref 0.5–1.3)
GLUCOSE SERPL-MCNC: 122 MG/DL — HIGH (ref 70–99)
POTASSIUM SERPL-MCNC: 4.3 MMOL/L — SIGNIFICANT CHANGE UP (ref 3.5–5.3)
POTASSIUM SERPL-SCNC: 4.3 MMOL/L — SIGNIFICANT CHANGE UP (ref 3.5–5.3)
SODIUM SERPL-SCNC: 144 MMOL/L — SIGNIFICANT CHANGE UP (ref 135–145)

## 2017-12-22 PROCEDURE — 83036 HEMOGLOBIN GLYCOSYLATED A1C: CPT

## 2017-12-22 PROCEDURE — 82550 ASSAY OF CK (CPK): CPT

## 2017-12-22 PROCEDURE — 93005 ELECTROCARDIOGRAM TRACING: CPT

## 2017-12-22 PROCEDURE — 99285 EMERGENCY DEPT VISIT HI MDM: CPT | Mod: 25

## 2017-12-22 PROCEDURE — 71275 CT ANGIOGRAPHY CHEST: CPT

## 2017-12-22 PROCEDURE — 93306 TTE W/DOPPLER COMPLETE: CPT

## 2017-12-22 PROCEDURE — 83735 ASSAY OF MAGNESIUM: CPT

## 2017-12-22 PROCEDURE — 80048 BASIC METABOLIC PNL TOTAL CA: CPT

## 2017-12-22 PROCEDURE — 84484 ASSAY OF TROPONIN QUANT: CPT

## 2017-12-22 PROCEDURE — 85730 THROMBOPLASTIN TIME PARTIAL: CPT

## 2017-12-22 PROCEDURE — 82553 CREATINE MB FRACTION: CPT

## 2017-12-22 PROCEDURE — 85610 PROTHROMBIN TIME: CPT

## 2017-12-22 PROCEDURE — 80053 COMPREHEN METABOLIC PANEL: CPT

## 2017-12-22 PROCEDURE — 84100 ASSAY OF PHOSPHORUS: CPT

## 2017-12-22 PROCEDURE — 74174 CTA ABD&PLVS W/CONTRAST: CPT

## 2017-12-22 PROCEDURE — 82962 GLUCOSE BLOOD TEST: CPT

## 2017-12-22 PROCEDURE — 85027 COMPLETE CBC AUTOMATED: CPT

## 2017-12-22 RX ORDER — NICARDIPINE HYDROCHLORIDE 30 MG/1
1 CAPSULE, EXTENDED RELEASE ORAL
Qty: 90 | Refills: 0 | OUTPATIENT
Start: 2017-12-22 | End: 2018-01-20

## 2017-12-22 RX ORDER — LABETALOL HCL 100 MG
1 TABLET ORAL
Qty: 90 | Refills: 0 | OUTPATIENT
Start: 2017-12-22 | End: 2018-01-20

## 2017-12-22 RX ORDER — LOSARTAN POTASSIUM 100 MG/1
1 TABLET, FILM COATED ORAL
Qty: 0 | Refills: 0 | COMMUNITY

## 2017-12-22 RX ADMIN — NICARDIPINE HYDROCHLORIDE 20 MILLIGRAM(S): 30 CAPSULE, EXTENDED RELEASE ORAL at 06:18

## 2017-12-22 RX ADMIN — Medication 100 MILLIGRAM(S): at 08:59

## 2017-12-22 RX ADMIN — Medication 300 MILLIGRAM(S): at 06:18

## 2017-12-22 RX ADMIN — LOSARTAN POTASSIUM 25 MILLIGRAM(S): 100 TABLET, FILM COATED ORAL at 06:18

## 2017-12-22 NOTE — DISCHARGE NOTE ADULT - PLAN OF CARE
stable Please f/u with your PCP in 4-7 days resolved Follow up with your medical doctor to establish long term blood pressure treatment goals. HOME CARE INSTRUCTIONS  Change the factors that you can control. Ask your caregiver for guidance concerning weight loss, quitting smoking, and alcohol consumption.  Avoid foods and drinks that make your symptoms worse, such as:   Caffeine or alcoholic drinks.   Chocolate.   Peppermint or mint flavorings.  Garlic and onions.  Spicy foods.   Citrus fruits, such as oranges, maria m, or limes.   Tomato-based foods such as sauce, chili, salsa, and pizza.  Fried and fatty foods.  Avoid lying down for the 3 hours prior to your bedtime or prior to taking a nap.  Eat small, frequent meals instead of large meals.   Wear loose-fitting clothing. Do not wear anything tight around your waist that causes pressure on your stomach.  Raise the head of your bed 6 to 8 inches with wood blocks to help you sleep. Extra pillows will not help.  Only take over-the-counter or prescription medicines for pain, discomfort, or fever as directed by your caregiver.   Do not take aspirin, ibuprofen, or other nonsteroidal anti-inflammatory drugs (NSAIDs).  SEEK IMMEDIATE MEDICAL CARE IF:  You have pain in your arms, neck, jaw, teeth, or back.   Your pain increases or changes in intensity or duration. c/w meds prescribed

## 2017-12-22 NOTE — DISCHARGE NOTE ADULT - HOSPITAL COURSE
72M c hx HTN, HLD, BPH, CAD s/p 9x stents (most recently Aug '17), CKD, transfer from [place], found to have abdominal aortic dissection flap.. would recommend abdominal duplex in three months and follow up with Dr. Esparza. Please continue with meds all prescribed.

## 2017-12-22 NOTE — DISCHARGE NOTE ADULT - PATIENT PORTAL LINK FT
“You can access the FollowHealth Patient Portal, offered by [facility], by registering with the following website: [email or web address]”

## 2017-12-22 NOTE — DISCHARGE NOTE ADULT - CARE PLAN
Principal Discharge DX:	Aortic dissection, abdominal  Goal:	stable  Instructions for follow-up, activity and diet:	Please f/u with your PCP in 4-7 days  Secondary Diagnosis:	Hypertensive emergency  Goal:	resolved  Instructions for follow-up, activity and diet:	Follow up with your medical doctor to establish long term blood pressure treatment goals.  Secondary Diagnosis:	GERD (gastroesophageal reflux disease)  Goal:	stable  Instructions for follow-up, activity and diet:	HOME CARE INSTRUCTIONS  Change the factors that you can control. Ask your caregiver for guidance concerning weight loss, quitting smoking, and alcohol consumption.  Avoid foods and drinks that make your symptoms worse, such as:   Caffeine or alcoholic drinks.   Chocolate.   Peppermint or mint flavorings.  Garlic and onions.  Spicy foods.   Citrus fruits, such as oranges, maria m, or limes.   Tomato-based foods such as sauce, chili, salsa, and pizza.  Fried and fatty foods.  Avoid lying down for the 3 hours prior to your bedtime or prior to taking a nap.  Eat small, frequent meals instead of large meals.   Wear loose-fitting clothing. Do not wear anything tight around your waist that causes pressure on your stomach.  Raise the head of your bed 6 to 8 inches with wood blocks to help you sleep. Extra pillows will not help.  Only take over-the-counter or prescription medicines for pain, discomfort, or fever as directed by your caregiver.   Do not take aspirin, ibuprofen, or other nonsteroidal anti-inflammatory drugs (NSAIDs).  SEEK IMMEDIATE MEDICAL CARE IF:  You have pain in your arms, neck, jaw, teeth, or back.   Your pain increases or changes in intensity or duration.  Secondary Diagnosis:	CAD (coronary artery disease)  Goal:	stable  Instructions for follow-up, activity and diet:	c/w meds prescribed

## 2017-12-22 NOTE — DISCHARGE NOTE ADULT - CARE PROVIDER_API CALL
Ez Chavis), Cardiovascular Disease; Internal Medicine  [address] [place] [address]  Phone: [phone]  Fax: [phone]    Michelle Esparza), Surgery  Vascular  [address] [place] [address]  [address], [place] [address]  Phone: [phone]  Fax: [phone]

## 2017-12-22 NOTE — DISCHARGE NOTE ADULT - MEDICATION SUMMARY - MEDICATIONS TO TAKE
I will START or STAY ON the medications listed below when I get home from the hospital:    acetaminophen 325 mg oral tablet  -- 2 tab(s) by mouth every 6 hours, As needed, mild to moderate pain 1-6  -- Indication: For pain     aspirin 81 mg oral tablet  -- 1 tab(s) by mouth once a day  -- Indication: For Cad     losartan 25 mg oral tablet  -- 1 tab(s) by mouth once a day  -- Indication: For Hypertension     allopurinol 100 mg oral tablet  -- 1 tab(s) by mouth 2 times a day  -- Indication: For gout     pravastatin 40 mg oral tablet  -- 1 tab(s) by mouth once a day (at bedtime)  -- Indication: For Hld     Plavix 75 mg oral tablet  -- 1 tab(s) by mouth once a day  -- Indication: For Cad     labetalol 300 mg oral tablet  -- 1 tab(s) by mouth every 8 hours  -- Indication: For Hypertension     niCARdipine 20 mg oral capsule  -- 1 cap(s) by mouth 3 times a day  -- Indication: For Htn

## 2018-01-25 ENCOUNTER — INPATIENT (INPATIENT)
Facility: HOSPITAL | Age: 73
LOS: 1 days | Discharge: HOME | DRG: 552 | End: 2018-01-27
Attending: INTERNAL MEDICINE | Admitting: INTERNAL MEDICINE
Payer: MEDICARE

## 2018-01-25 VITALS
OXYGEN SATURATION: 100 % | HEART RATE: 100 BPM | DIASTOLIC BLOOD PRESSURE: 93 MMHG | SYSTOLIC BLOOD PRESSURE: 167 MMHG | RESPIRATION RATE: 18 BRPM

## 2018-01-25 DIAGNOSIS — M54.9 DORSALGIA, UNSPECIFIED: ICD-10-CM

## 2018-01-25 DIAGNOSIS — K08.409 PARTIAL LOSS OF TEETH, UNSPECIFIED CAUSE, UNSPECIFIED CLASS: Chronic | ICD-10-CM

## 2018-01-25 DIAGNOSIS — I25.10 ATHEROSCLEROTIC HEART DISEASE OF NATIVE CORONARY ARTERY WITHOUT ANGINA PECTORIS: Chronic | ICD-10-CM

## 2018-01-25 DIAGNOSIS — Z98.89 OTHER SPECIFIED POSTPROCEDURAL STATES: Chronic | ICD-10-CM

## 2018-01-25 DIAGNOSIS — N20.1 CALCULUS OF URETER: Chronic | ICD-10-CM

## 2018-01-25 DIAGNOSIS — H26.9 UNSPECIFIED CATARACT: Chronic | ICD-10-CM

## 2018-01-25 DIAGNOSIS — Z98.61 CORONARY ANGIOPLASTY STATUS: Chronic | ICD-10-CM

## 2018-01-25 LAB
ALBUMIN SERPL ELPH-MCNC: 4.7 G/DL — SIGNIFICANT CHANGE UP (ref 3.3–5)
ALP SERPL-CCNC: 85 U/L — SIGNIFICANT CHANGE UP (ref 40–120)
ALT FLD-CCNC: 25 U/L RC — SIGNIFICANT CHANGE UP (ref 10–45)
ANION GAP SERPL CALC-SCNC: 14 MMOL/L — SIGNIFICANT CHANGE UP (ref 5–17)
APTT BLD: 28.4 SEC — SIGNIFICANT CHANGE UP (ref 27.5–37.4)
AST SERPL-CCNC: 22 U/L — SIGNIFICANT CHANGE UP (ref 10–40)
BASE EXCESS BLDV CALC-SCNC: 2 MMOL/L — SIGNIFICANT CHANGE UP (ref -2–2)
BASOPHILS # BLD AUTO: 0.1 K/UL — SIGNIFICANT CHANGE UP (ref 0–0.2)
BASOPHILS NFR BLD AUTO: 0.9 % — SIGNIFICANT CHANGE UP (ref 0–2)
BILIRUB SERPL-MCNC: 0.9 MG/DL — SIGNIFICANT CHANGE UP (ref 0.2–1.2)
BUN SERPL-MCNC: 20 MG/DL — SIGNIFICANT CHANGE UP (ref 7–23)
CA-I SERPL-SCNC: 1.29 MMOL/L — SIGNIFICANT CHANGE UP (ref 1.12–1.3)
CALCIUM SERPL-MCNC: 10.1 MG/DL — SIGNIFICANT CHANGE UP (ref 8.4–10.5)
CHLORIDE BLDV-SCNC: 106 MMOL/L — SIGNIFICANT CHANGE UP (ref 96–108)
CHLORIDE SERPL-SCNC: 102 MMOL/L — SIGNIFICANT CHANGE UP (ref 96–108)
CK MB CFR SERPL CALC: 1.3 NG/ML — SIGNIFICANT CHANGE UP (ref 0–6.7)
CK SERPL-CCNC: 72 U/L — SIGNIFICANT CHANGE UP (ref 30–200)
CO2 BLDV-SCNC: 29 MMOL/L — SIGNIFICANT CHANGE UP (ref 22–30)
CO2 SERPL-SCNC: 25 MMOL/L — SIGNIFICANT CHANGE UP (ref 22–31)
CREAT SERPL-MCNC: 1.16 MG/DL — SIGNIFICANT CHANGE UP (ref 0.5–1.3)
EOSINOPHIL # BLD AUTO: 0.2 K/UL — SIGNIFICANT CHANGE UP (ref 0–0.5)
EOSINOPHIL NFR BLD AUTO: 2.5 % — SIGNIFICANT CHANGE UP (ref 0–6)
GAS PNL BLDV: 141 MMOL/L — SIGNIFICANT CHANGE UP (ref 136–145)
GAS PNL BLDV: SIGNIFICANT CHANGE UP
GLUCOSE BLDV-MCNC: 148 MG/DL — HIGH (ref 70–99)
GLUCOSE SERPL-MCNC: 156 MG/DL — HIGH (ref 70–99)
HCO3 BLDV-SCNC: 28 MMOL/L — SIGNIFICANT CHANGE UP (ref 21–29)
HCT VFR BLD CALC: 42 % — SIGNIFICANT CHANGE UP (ref 39–50)
HCT VFR BLDA CALC: 44 % — SIGNIFICANT CHANGE UP (ref 39–50)
HGB BLD CALC-MCNC: 14.2 G/DL — SIGNIFICANT CHANGE UP (ref 13–17)
HGB BLD-MCNC: 14.6 G/DL — SIGNIFICANT CHANGE UP (ref 13–17)
INR BLD: 1.04 RATIO — SIGNIFICANT CHANGE UP (ref 0.88–1.16)
LACTATE BLDV-MCNC: 2.2 MMOL/L — HIGH (ref 0.7–2)
LYMPHOCYTES # BLD AUTO: 0.9 K/UL — LOW (ref 1–3.3)
LYMPHOCYTES # BLD AUTO: 14.2 % — SIGNIFICANT CHANGE UP (ref 13–44)
MCHC RBC-ENTMCNC: 32 PG — SIGNIFICANT CHANGE UP (ref 27–34)
MCHC RBC-ENTMCNC: 34.8 GM/DL — SIGNIFICANT CHANGE UP (ref 32–36)
MCV RBC AUTO: 92 FL — SIGNIFICANT CHANGE UP (ref 80–100)
MONOCYTES # BLD AUTO: 0.4 K/UL — SIGNIFICANT CHANGE UP (ref 0–0.9)
MONOCYTES NFR BLD AUTO: 6.5 % — SIGNIFICANT CHANGE UP (ref 2–14)
NEUTROPHILS # BLD AUTO: 4.9 K/UL — SIGNIFICANT CHANGE UP (ref 1.8–7.4)
NEUTROPHILS NFR BLD AUTO: 75.9 % — SIGNIFICANT CHANGE UP (ref 43–77)
NT-PROBNP SERPL-SCNC: 65 PG/ML — SIGNIFICANT CHANGE UP (ref 0–300)
OTHER CELLS CSF MANUAL: 10 ML/DL — LOW (ref 18–22)
PCO2 BLDV: 49 MMHG — SIGNIFICANT CHANGE UP (ref 35–50)
PH BLDV: 7.37 — SIGNIFICANT CHANGE UP (ref 7.35–7.45)
PLATELET # BLD AUTO: 141 K/UL — LOW (ref 150–400)
PO2 BLDV: 30 MMHG — SIGNIFICANT CHANGE UP (ref 25–45)
POTASSIUM BLDV-SCNC: 4.3 MMOL/L — SIGNIFICANT CHANGE UP (ref 3.5–5)
POTASSIUM SERPL-MCNC: 4.4 MMOL/L — SIGNIFICANT CHANGE UP (ref 3.5–5.3)
POTASSIUM SERPL-SCNC: 4.4 MMOL/L — SIGNIFICANT CHANGE UP (ref 3.5–5.3)
PROT SERPL-MCNC: 8.2 G/DL — SIGNIFICANT CHANGE UP (ref 6–8.3)
PROTHROM AB SERPL-ACNC: 11.2 SEC — SIGNIFICANT CHANGE UP (ref 9.8–12.7)
RBC # BLD: 4.56 M/UL — SIGNIFICANT CHANGE UP (ref 4.2–5.8)
RBC # FLD: 13.4 % — SIGNIFICANT CHANGE UP (ref 10.3–14.5)
SAO2 % BLDV: 52 % — LOW (ref 67–88)
SODIUM SERPL-SCNC: 141 MMOL/L — SIGNIFICANT CHANGE UP (ref 135–145)
TROPONIN T SERPL-MCNC: <0.01 NG/ML — SIGNIFICANT CHANGE UP (ref 0–0.06)
TROPONIN T SERPL-MCNC: <0.01 NG/ML — SIGNIFICANT CHANGE UP (ref 0–0.06)
WBC # BLD: 6.4 K/UL — SIGNIFICANT CHANGE UP (ref 3.8–10.5)
WBC # FLD AUTO: 6.4 K/UL — SIGNIFICANT CHANGE UP (ref 3.8–10.5)

## 2018-01-25 PROCEDURE — 99285 EMERGENCY DEPT VISIT HI MDM: CPT | Mod: 25

## 2018-01-25 PROCEDURE — 76705 ECHO EXAM OF ABDOMEN: CPT | Mod: 26

## 2018-01-25 PROCEDURE — 71045 X-RAY EXAM CHEST 1 VIEW: CPT | Mod: 26

## 2018-01-25 PROCEDURE — 71275 CT ANGIOGRAPHY CHEST: CPT | Mod: 26

## 2018-01-25 PROCEDURE — 93010 ELECTROCARDIOGRAM REPORT: CPT

## 2018-01-25 PROCEDURE — 74174 CTA ABD&PLVS W/CONTRAST: CPT | Mod: 26

## 2018-01-25 RX ORDER — CLOPIDOGREL BISULFATE 75 MG/1
75 TABLET, FILM COATED ORAL DAILY
Qty: 0 | Refills: 0 | Status: DISCONTINUED | OUTPATIENT
Start: 2018-01-25 | End: 2018-01-27

## 2018-01-25 RX ORDER — SODIUM CHLORIDE 9 MG/ML
3 INJECTION INTRAMUSCULAR; INTRAVENOUS; SUBCUTANEOUS ONCE
Qty: 0 | Refills: 0 | Status: COMPLETED | OUTPATIENT
Start: 2018-01-25 | End: 2018-01-25

## 2018-01-25 RX ORDER — LABETALOL HCL 100 MG
300 TABLET ORAL ONCE
Qty: 0 | Refills: 0 | Status: COMPLETED | OUTPATIENT
Start: 2018-01-25 | End: 2018-01-25

## 2018-01-25 RX ORDER — ALLOPURINOL 300 MG
100 TABLET ORAL
Qty: 0 | Refills: 0 | Status: DISCONTINUED | OUTPATIENT
Start: 2018-01-25 | End: 2018-01-27

## 2018-01-25 RX ORDER — CLOPIDOGREL BISULFATE 75 MG/1
1 TABLET, FILM COATED ORAL
Qty: 0 | Refills: 0 | COMMUNITY

## 2018-01-25 RX ORDER — NIFEDIPINE 30 MG
30 TABLET, EXTENDED RELEASE 24 HR ORAL DAILY
Qty: 0 | Refills: 0 | Status: DISCONTINUED | OUTPATIENT
Start: 2018-01-25 | End: 2018-01-26

## 2018-01-25 RX ORDER — ASPIRIN/CALCIUM CARB/MAGNESIUM 324 MG
1 TABLET ORAL
Qty: 0 | Refills: 0 | COMMUNITY

## 2018-01-25 RX ORDER — LABETALOL HCL 100 MG
10 TABLET ORAL ONCE
Qty: 0 | Refills: 0 | Status: COMPLETED | OUTPATIENT
Start: 2018-01-25 | End: 2018-01-25

## 2018-01-25 RX ORDER — ALLOPURINOL 300 MG
1 TABLET ORAL
Qty: 0 | Refills: 0 | COMMUNITY

## 2018-01-25 RX ORDER — SODIUM CHLORIDE 9 MG/ML
1000 INJECTION INTRAMUSCULAR; INTRAVENOUS; SUBCUTANEOUS ONCE
Qty: 0 | Refills: 0 | Status: COMPLETED | OUTPATIENT
Start: 2018-01-25 | End: 2018-01-25

## 2018-01-25 RX ORDER — ACETAMINOPHEN 500 MG
650 TABLET ORAL ONCE
Qty: 0 | Refills: 0 | Status: COMPLETED | OUTPATIENT
Start: 2018-01-25 | End: 2018-01-25

## 2018-01-25 RX ORDER — LOSARTAN POTASSIUM 100 MG/1
25 TABLET, FILM COATED ORAL DAILY
Qty: 0 | Refills: 0 | Status: DISCONTINUED | OUTPATIENT
Start: 2018-01-25 | End: 2018-01-27

## 2018-01-25 RX ORDER — LABETALOL HCL 100 MG
300 TABLET ORAL THREE TIMES A DAY
Qty: 0 | Refills: 0 | Status: DISCONTINUED | OUTPATIENT
Start: 2018-01-25 | End: 2018-01-26

## 2018-01-25 RX ORDER — ASPIRIN/CALCIUM CARB/MAGNESIUM 324 MG
81 TABLET ORAL DAILY
Qty: 0 | Refills: 0 | Status: DISCONTINUED | OUTPATIENT
Start: 2018-01-25 | End: 2018-01-27

## 2018-01-25 RX ORDER — INFLUENZA VIRUS VACCINE 15; 15; 15; 15 UG/.5ML; UG/.5ML; UG/.5ML; UG/.5ML
0.5 SUSPENSION INTRAMUSCULAR ONCE
Qty: 0 | Refills: 0 | Status: DISCONTINUED | OUTPATIENT
Start: 2018-01-25 | End: 2018-01-27

## 2018-01-25 RX ORDER — ATORVASTATIN CALCIUM 80 MG/1
10 TABLET, FILM COATED ORAL AT BEDTIME
Qty: 0 | Refills: 0 | Status: DISCONTINUED | OUTPATIENT
Start: 2018-01-25 | End: 2018-01-27

## 2018-01-25 RX ADMIN — Medication 10 MILLIGRAM(S): at 10:09

## 2018-01-25 RX ADMIN — SODIUM CHLORIDE 3 MILLILITER(S): 9 INJECTION INTRAMUSCULAR; INTRAVENOUS; SUBCUTANEOUS at 09:55

## 2018-01-25 RX ADMIN — Medication 30 MILLIGRAM(S): at 23:53

## 2018-01-25 RX ADMIN — SODIUM CHLORIDE 1000 MILLILITER(S): 9 INJECTION INTRAMUSCULAR; INTRAVENOUS; SUBCUTANEOUS at 10:02

## 2018-01-25 RX ADMIN — ATORVASTATIN CALCIUM 10 MILLIGRAM(S): 80 TABLET, FILM COATED ORAL at 23:09

## 2018-01-25 RX ADMIN — Medication 650 MILLIGRAM(S): at 19:16

## 2018-01-25 RX ADMIN — Medication 10 MILLIGRAM(S): at 09:55

## 2018-01-25 RX ADMIN — Medication 100 MILLIGRAM(S): at 18:07

## 2018-01-25 RX ADMIN — Medication 81 MILLIGRAM(S): at 18:07

## 2018-01-25 RX ADMIN — Medication 300 MILLIGRAM(S): at 11:07

## 2018-01-25 RX ADMIN — Medication 300 MILLIGRAM(S): at 23:09

## 2018-01-25 NOTE — ED ADULT NURSE NOTE - OBJECTIVE STATEMENT
72y male presents to Ed a/ox3 and ambulatory complaining of back pain and numbness. Pt states he has a hx of aortic dissection 1 month ago, being controlled with blood pressure medication at home. Pt states the medication made him feel weak and he started cutting his dose in half without consultation with MD. Pt states pain between shoulder blades started last night and continue into this morning, 10/10 between shoulder blades, non radiating. Pt also states that both his arms feel numb and that this is how he felt 1 month ago. Pt breathing spontaneous and unlabored with lungs clear to auscultation bilaterally. Pt skin is warm, dry and intact with no edema present. Pt R radial pulse is weak, L radial pulse is strong and regular. Pt abdomen soft, nontender, nondistended with bowel sounds present in all 4 quadrants. Pt PERRL, with equal strength bilaterally in upper and lower extremities with full sensation. Pt denies chest pain and SOB, denies n/v/d, denies fever/chills and cough, denies dysuria, denies tingling and weakness. 72y male presents to Ed a/ox3 and ambulatory complaining of back pain and numbness. Pt states he has a hx of aortic aneurysm 1 month ago, being controlled with blood pressure medication at home. Pt states the medication made him feel weak and he started cutting his dose in half without consultation with MD. Pt states pain between shoulder blades started last night and continue into this morning, 10/10 between shoulder blades, non radiating. Pt also states that both his arms feel numb and that this is how he felt 1 month ago. Pt breathing spontaneous and unlabored with lungs clear to auscultation bilaterally. Pt skin is warm, dry and intact with no edema present. Pt R radial pulse is weak, L radial pulse is strong and regular. Pt abdomen soft, nontender, nondistended with bowel sounds present in all 4 quadrants. Pt PERRL, with equal strength bilaterally in upper and lower extremities with full sensation. Pt denies chest pain and SOB, denies n/v/d, denies fever/chills and cough, denies dysuria, denies tingling and weakness.

## 2018-01-25 NOTE — ED PROVIDER NOTE - MEDICAL DECISION MAKING DETAILS
72M c hx HTN, HLD, BPH, CAD s/p 9x stents (most recently Aug '17), CKD, admission last month for abdominal aortic dissection, p/w severe back pain since last night and bilateral arm numbness, same symptoms as last month when diagnosis made. PE: Hypertensive (equal BP bilaterally - ), no murmur, lungs clear, equal radial pulses, AAO x 3. Plan: BP control w/ labatelol, CTA, admission. 72M c hx HTN, HLD, BPH, CAD s/p 9x stents (most recently Aug '17), CKD, admission last month for abdominal aortic dissection, p/w severe back pain since last night and bilateral arm numbness, same symptoms as last month when diagnosis made. PE: Hypertensive (equal BP bilaterally - ), no murmur, lungs clear, equal radial pulses, AAO x 3. Plan: BP control w/ labatelol, CTA, admission.  Diana: Patient with return of chest/back pain which he associated with his aortic disection. Has not been taking his HTN meds. Some activity component, 9 stents with long cardiac hx. If not disection ischemia most likely.

## 2018-01-25 NOTE — ED PROVIDER NOTE - OBJECTIVE STATEMENT
72M c hx HTN, HLD, BPH, CAD s/p 9x stents (most recently Aug '17), CKD, admission last month for abdominal aortic dissection, p/w severe back pain since last night and bilateral arm numbness; symptoms are similar to those he had last month when aortic dissection diagnosis was made. Was DC'd on labatelol and nifedipine but has not been fully complaint with the medications b/c at times he feels dizzy or light-headed after taking them. Did not take any meds this morning.

## 2018-01-25 NOTE — ED PROCEDURE NOTE - CPROC ED TIME OUT STATEMENT1
“Patient's name, , procedure and correct site were confirmed during the [facility] Timeout.”
“Patient's name, , procedure and correct site were confirmed during the [facility] Timeout.”

## 2018-01-25 NOTE — H&P ADULT - ASSESSMENT
71 yo male history as noted above a/w back pain    CTA reviewed  no change in aortic aneurysm/dissection  cont blood pressure control  cards f/u  pain likely due to elevated bp while non compliant with meds    trend CE  cont home meds    dvt ppx  d/w dr. garcia

## 2018-01-25 NOTE — ED ADULT NURSE REASSESSMENT NOTE - NS ED NURSE REASSESS COMMENT FT1
Pt a/ox3 stating that his back pain has resolved. Pt states he no longer feels numbness in both his arms. BP in 150s, vitals stable. Pt admitted to tele, awaiting a bed upstairs.

## 2018-01-25 NOTE — ED PROVIDER NOTE - ATTENDING CONTRIBUTION TO CARE
I performed a history and physical exam of the patient and discussed their management with the resident and /or advanced care provider. I reviewed the resident and /or ACP's note and agree with the documented findings and plan of care. My medical decision making and observations are found above.  Abd soft, Lungs clear.

## 2018-01-25 NOTE — H&P ADULT - FAMILY HISTORY
Family history of CABG, mother with CABG at 79yo     Mother  Still living? Unknown  Family history of coronary artery disease, Age at diagnosis: Age Unknown

## 2018-01-25 NOTE — ED PROCEDURE NOTE - PROCEDURE ADDITIONAL DETAILS
Pocus of Abdominal Aorta performed. Full report to be found in PACS.
POCUS: Emergency Department Focused Ultrasound performed at patient's bedside.  The complete report will be available in PACS.

## 2018-01-25 NOTE — ED ADULT NURSE NOTE - CHPI ED SYMPTOMS NEG
no shortness of breath/no vomiting/no chills/no dizziness/no fever/no nausea/no syncope/no chest pain/no cough

## 2018-01-26 ENCOUNTER — TRANSCRIPTION ENCOUNTER (OUTPATIENT)
Age: 73
End: 2018-01-26

## 2018-01-26 LAB
ANION GAP SERPL CALC-SCNC: 13 MMOL/L — SIGNIFICANT CHANGE UP (ref 5–17)
BUN SERPL-MCNC: 19 MG/DL — SIGNIFICANT CHANGE UP (ref 7–23)
CALCIUM SERPL-MCNC: 9.3 MG/DL — SIGNIFICANT CHANGE UP (ref 8.4–10.5)
CHLORIDE SERPL-SCNC: 107 MMOL/L — SIGNIFICANT CHANGE UP (ref 96–108)
CK SERPL-CCNC: 80 U/L — SIGNIFICANT CHANGE UP (ref 30–200)
CO2 SERPL-SCNC: 24 MMOL/L — SIGNIFICANT CHANGE UP (ref 22–31)
CREAT SERPL-MCNC: 1.34 MG/DL — HIGH (ref 0.5–1.3)
GLUCOSE SERPL-MCNC: 112 MG/DL — HIGH (ref 70–99)
HCT VFR BLD CALC: 38.1 % — LOW (ref 39–50)
HGB BLD-MCNC: 12.3 G/DL — LOW (ref 13–17)
MCHC RBC-ENTMCNC: 30.5 PG — SIGNIFICANT CHANGE UP (ref 27–34)
MCHC RBC-ENTMCNC: 32.3 GM/DL — SIGNIFICANT CHANGE UP (ref 32–36)
MCV RBC AUTO: 94.5 FL — SIGNIFICANT CHANGE UP (ref 80–100)
PLATELET # BLD AUTO: 143 K/UL — LOW (ref 150–400)
POTASSIUM SERPL-MCNC: 5.1 MMOL/L — SIGNIFICANT CHANGE UP (ref 3.5–5.3)
POTASSIUM SERPL-SCNC: 5.1 MMOL/L — SIGNIFICANT CHANGE UP (ref 3.5–5.3)
RBC # BLD: 4.03 M/UL — LOW (ref 4.2–5.8)
RBC # FLD: 14.9 % — HIGH (ref 10.3–14.5)
SODIUM SERPL-SCNC: 144 MMOL/L — SIGNIFICANT CHANGE UP (ref 135–145)
TROPONIN T SERPL-MCNC: <0.01 NG/ML — SIGNIFICANT CHANGE UP (ref 0–0.06)
WBC # BLD: 6.98 K/UL — SIGNIFICANT CHANGE UP (ref 3.8–10.5)
WBC # FLD AUTO: 6.98 K/UL — SIGNIFICANT CHANGE UP (ref 3.8–10.5)

## 2018-01-26 RX ORDER — LOSARTAN POTASSIUM 100 MG/1
1 TABLET, FILM COATED ORAL
Qty: 30 | Refills: 0 | OUTPATIENT

## 2018-01-26 RX ORDER — DOCUSATE SODIUM 100 MG
100 CAPSULE ORAL
Qty: 0 | Refills: 0 | Status: DISCONTINUED | OUTPATIENT
Start: 2018-01-26 | End: 2018-01-27

## 2018-01-26 RX ORDER — CLOPIDOGREL BISULFATE 75 MG/1
1 TABLET, FILM COATED ORAL
Qty: 0 | Refills: 0 | COMMUNITY
Start: 2018-01-26

## 2018-01-26 RX ORDER — ASPIRIN/CALCIUM CARB/MAGNESIUM 324 MG
1 TABLET ORAL
Qty: 0 | Refills: 0 | COMMUNITY

## 2018-01-26 RX ORDER — NIFEDIPINE 30 MG
1 TABLET, EXTENDED RELEASE 24 HR ORAL
Qty: 0 | Refills: 0 | COMMUNITY

## 2018-01-26 RX ORDER — HEPARIN SODIUM 5000 [USP'U]/ML
5000 INJECTION INTRAVENOUS; SUBCUTANEOUS EVERY 8 HOURS
Qty: 0 | Refills: 0 | Status: DISCONTINUED | OUTPATIENT
Start: 2018-01-26 | End: 2018-01-27

## 2018-01-26 RX ORDER — SODIUM CHLORIDE 9 MG/ML
250 INJECTION INTRAMUSCULAR; INTRAVENOUS; SUBCUTANEOUS ONCE
Qty: 0 | Refills: 0 | Status: COMPLETED | OUTPATIENT
Start: 2018-01-26 | End: 2018-01-26

## 2018-01-26 RX ORDER — LABETALOL HCL 100 MG
1 TABLET ORAL
Qty: 0 | Refills: 0 | COMMUNITY

## 2018-01-26 RX ORDER — SENNA PLUS 8.6 MG/1
2 TABLET ORAL AT BEDTIME
Qty: 0 | Refills: 0 | Status: DISCONTINUED | OUTPATIENT
Start: 2018-01-26 | End: 2018-01-27

## 2018-01-26 RX ORDER — ACETAMINOPHEN 500 MG
650 TABLET ORAL EVERY 6 HOURS
Qty: 0 | Refills: 0 | Status: DISCONTINUED | OUTPATIENT
Start: 2018-01-26 | End: 2018-01-27

## 2018-01-26 RX ORDER — CLOPIDOGREL BISULFATE 75 MG/1
1 TABLET, FILM COATED ORAL
Qty: 0 | Refills: 0 | COMMUNITY

## 2018-01-26 RX ORDER — ASPIRIN/CALCIUM CARB/MAGNESIUM 324 MG
1 TABLET ORAL
Qty: 0 | Refills: 0 | COMMUNITY
Start: 2018-01-26

## 2018-01-26 RX ORDER — LOSARTAN POTASSIUM 100 MG/1
1 TABLET, FILM COATED ORAL
Qty: 0 | Refills: 0 | COMMUNITY
Start: 2018-01-26

## 2018-01-26 RX ORDER — LABETALOL HCL 100 MG
200 TABLET ORAL THREE TIMES A DAY
Qty: 0 | Refills: 0 | Status: DISCONTINUED | OUTPATIENT
Start: 2018-01-26 | End: 2018-01-27

## 2018-01-26 RX ADMIN — Medication 100 MILLIGRAM(S): at 08:36

## 2018-01-26 RX ADMIN — Medication 300 MILLIGRAM(S): at 13:24

## 2018-01-26 RX ADMIN — Medication 650 MILLIGRAM(S): at 14:51

## 2018-01-26 RX ADMIN — Medication 81 MILLIGRAM(S): at 13:25

## 2018-01-26 RX ADMIN — Medication 100 MILLIGRAM(S): at 18:05

## 2018-01-26 RX ADMIN — Medication 100 MILLIGRAM(S): at 21:45

## 2018-01-26 RX ADMIN — Medication 300 MILLIGRAM(S): at 05:38

## 2018-01-26 RX ADMIN — HEPARIN SODIUM 5000 UNIT(S): 5000 INJECTION INTRAVENOUS; SUBCUTANEOUS at 13:28

## 2018-01-26 RX ADMIN — SODIUM CHLORIDE 500 MILLILITER(S): 9 INJECTION INTRAMUSCULAR; INTRAVENOUS; SUBCUTANEOUS at 14:54

## 2018-01-26 RX ADMIN — SODIUM CHLORIDE 500 MILLILITER(S): 9 INJECTION INTRAMUSCULAR; INTRAVENOUS; SUBCUTANEOUS at 18:06

## 2018-01-26 RX ADMIN — SENNA PLUS 2 TABLET(S): 8.6 TABLET ORAL at 21:45

## 2018-01-26 RX ADMIN — Medication 650 MILLIGRAM(S): at 14:00

## 2018-01-26 RX ADMIN — Medication 200 MILLIGRAM(S): at 22:31

## 2018-01-26 RX ADMIN — CLOPIDOGREL BISULFATE 75 MILLIGRAM(S): 75 TABLET, FILM COATED ORAL at 13:25

## 2018-01-26 RX ADMIN — LOSARTAN POTASSIUM 25 MILLIGRAM(S): 100 TABLET, FILM COATED ORAL at 05:39

## 2018-01-26 RX ADMIN — ATORVASTATIN CALCIUM 10 MILLIGRAM(S): 80 TABLET, FILM COATED ORAL at 21:45

## 2018-01-26 RX ADMIN — Medication 30 MILLIGRAM(S): at 06:20

## 2018-01-26 NOTE — PROGRESS NOTE ADULT - SUBJECTIVE AND OBJECTIVE BOX
TRISTAN HERMOSILLO  72y Male  MRN:[id number]    Patient is a 72y old  Male who presents with a chief complaint of back pain (25 Jan 2018 15:05)    HPI:  72M c hx HTN, HLD, BPH, CAD s/p 9x stents (most recently Aug '17), CKD, admission last month for abdominal aortic dissection, p/w severe back pain since last night and bilateral arm numbness; symptoms are similar to those he had last month when aortic dissection diagnosis was made. Was DC'd on labatelol and nifedipine but has not been fully complaint with the medications b/c at times he feels dizzy or light-headed after taking them. Did not take any meds this morning. (25 Jan 2018 15:05)      Patient seen and evaluated at bedside. No acute events overnight except as noted.    Interval HPI: back pain resolved. no c/o multiple vague c/o. including total body ?pins/ needles. mainly when getting up    PAST MEDICAL & SURGICAL HISTORY:  Diverticulosis  MI (myocardial infarction)  Diverticulosis  BPH (benign prostatic hyperplasia)  Enlarged aorta  Gout  GERD (gastroesophageal reflux disease)  S/P coronary artery stent placement: 2001 LCx and RCA, 6/2014 &amp; 1/2015  MI, old: 2001  CAD (coronary artery disease)  Hypercholesteremia  HTN (hypertension)  S/P coronary angioplasty: 8 stents  CAD (coronary artery disease): 8 stents  Ureteral stone: Insertion of right  ureteral stent - 9/2015  S/P angioplasty with stent: x 3 ( 12/2001, 6/2014 &amp; 01/2015)  H/O tooth extraction  Cataract: s/p extraction bilat 2/2014         VITALS:  Vital Signs Last 24 Hrs  T(C): 36.9 (26 Jan 2018 05:08), Max: 37.2 (25 Jan 2018 15:34)  T(F): 98.5 (26 Jan 2018 05:08), Max: 99 (25 Jan 2018 15:34)  HR: 76 (26 Jan 2018 06:19) (71 - 85)  BP: 118/75 (26 Jan 2018 06:19) (118/75 - 155/93)  BP(mean): --  RR: 18 (26 Jan 2018 05:08) (18 - 18)  SpO2: 96% (26 Jan 2018 05:08) (94% - 96%)  CAPILLARY BLOOD GLUCOSE        I&O's Summary    26 Jan 2018 07:01  -  26 Jan 2018 14:23  --------------------------------------------------------  IN: 360 mL / OUT: 0 mL / NET: 360 mL        PHYSICAL EXAM:  GENERAL: NAD, well-developed  HEAD:  Atraumatic, Normocephalic  EYES: EOMI, PERRLA, conjunctiva and sclera clear  NECK: Supple, No JVD  CHEST/LUNG: Clear to auscultation bilaterally; No wheeze  HEART: S1, S2; No murmurs, rubs, or gallops  ABDOMEN: Soft, Nontender, Nondistended; Bowel sounds present  EXTREMITIES:  2+ Peripheral Pulses, No clubbing, cyanosis, or edema  PSYCH: Normal affect  NEUROLOGY: AAOX3; non-focal  SKIN: No rashes or lesions    Consultant(s) Notes Reviewed:  [x ] YES  [ ] NO  Care Discussed with Consultants/Other Providers [ x] YES  [ ] NO    MEDS:  MEDICATIONS  (STANDING):  allopurinol 100 milliGRAM(s) Oral two times a day after meals  aspirin enteric coated 81 milliGRAM(s) Oral daily  atorvastatin 10 milliGRAM(s) Oral at bedtime  clopidogrel Tablet 75 milliGRAM(s) Oral daily  heparin  Injectable 5000 Unit(s) SubCutaneous every 8 hours  influenza   Vaccine 0.5 milliLiter(s) IntraMuscular once  labetalol 300 milliGRAM(s) Oral three times a day  losartan 25 milliGRAM(s) Oral daily  NIFEdipine XL 30 milliGRAM(s) Oral daily    MEDICATIONS  (PRN):    ALLERGIES:  No Known Drug Allergies  strawberry (Hives)      LABS:                        12.3   6.98  )-----------( 143      ( 26 Jan 2018 07:38 )             38.1     01-26    144  |  107  |  19  ----------------------------<  112<H>  5.1   |  24  |  1.34<H>    Ca    9.3      26 Jan 2018 07:38    TPro  8.2  /  Alb  4.7  /  TBili  0.9  /  DBili  x   /  AST  22  /  ALT  25  /  AlkPhos  85  01-25    PT/INR - ( 25 Jan 2018 09:40 )   PT: 11.2 sec;   INR: 1.04 ratio         PTT - ( 25 Jan 2018 09:40 )  PTT:28.4 sec  CARDIAC MARKERS ( 26 Jan 2018 07:38 )  x     / x     / 80 U/L / x     / x      CARDIAC MARKERS ( 26 Jan 2018 05:27 )  x     / <0.01 ng/mL / x     / x     / x      CARDIAC MARKERS ( 25 Jan 2018 21:21 )  x     / x     / 72 U/L / x     / x      CARDIAC MARKERS ( 25 Jan 2018 18:34 )  x     / <0.01 ng/mL / x     / x     / 1.3 ng/mL  CARDIAC MARKERS ( 25 Jan 2018 09:40 )  x     / <0.01 ng/mL / x     / x     / x          LIVER FUNCTIONS - ( 25 Jan 2018 09:40 )  Alb: 4.7 g/dL / Pro: 8.2 g/dL / ALK PHOS: 85 U/L / ALT: 25 U/L RC / AST: 22 U/L / GGT: x             TSH:   A1c:  BNP:  Lipid panel:   cultures:     RADIOLOGY & ADDITIONAL TESTS:    Imaging Personally Reviewed:  [ ] YES  [ ] NO

## 2018-01-26 NOTE — DISCHARGE NOTE ADULT - CARE PLAN
Principal Discharge DX:	Back pain  Goal:	Patient asymptomatic.  Assessment and plan of treatment:	Relieved on its own.  Secondary Diagnosis:	CAD (coronary artery disease)  Assessment and plan of treatment:	Coronary artery disease is a condition where the arteries the supply the heart muscle get clogges with fatty deposits & puts you at risk for a heart attack  Call your doctor if you have any new pain, pressure, or discomfort in the center of your chest, pain, tingling or discomfort in arms, back, neck, jaw, or stomach, shortness of breath, nausea, vomiting, burping or heartburn, sweating, cold and clammy skin, racing or abnormal heartbeat for more than 10 minutes or if they keep coming & going.  Call 911 and do not tr to get to hospital by care  You can help yourself with lefestyle changes (quitting smoking if you smoke), eat lots of fruits & vegetables & low fat dairy products, not a lot of meat & fatty foods, walk or some form of physical activity most days of the week, lose weight if you are overweight  Take your cardiac medication as prescribed to lower cholesterol, to lower blood pressure, aspirin to prevent blood clots, and diabetes control  Make sure to keep appointments with doctor for cardiac follow up care  Secondary Diagnosis:	HTN (hypertension)  Assessment and plan of treatment:	Low salt diet  Activity as tolerated.  Take all medication as prescribed.  Follow up with your medical doctor for routine blood pressure monitoring at your next visit.  Notify your doctor if you have any of the following symptoms:   Dizziness, Lightheadedness, Blurry vision, Headache, Chest pain, Shortness of breath  Secondary Diagnosis:	Hypercholesteremia  Assessment and plan of treatment:	continue with current medication.

## 2018-01-26 NOTE — DISCHARGE NOTE ADULT - MEDICATION SUMMARY - MEDICATIONS TO STOP TAKING
I will STOP taking the medications listed below when I get home from the hospital:    NIFEdipine 30 mg oral tablet, extended release  -- 1 tab(s) by mouth once a day

## 2018-01-26 NOTE — DISCHARGE NOTE ADULT - CARE PROVIDER_API CALL
Kraig Long), Cardiovascular Disease; Internal Medicine; Interventional Cardiology  [address] [address]  Phone: [phone]  Fax: [phone]

## 2018-01-26 NOTE — DISCHARGE NOTE ADULT - MEDICATION SUMMARY - MEDICATIONS TO CHANGE
I will SWITCH the dose or number of times a day I take the medications listed below when I get home from the hospital:    labetalol 300 mg oral tablet  -- 1 tab(s) by mouth 3 times a day

## 2018-01-26 NOTE — DISCHARGE NOTE ADULT - PATIENT PORTAL LINK FT
“You can access the FollowHealth Patient Portal, offered by [facility], by registering with the following website: [email or web address]”

## 2018-01-26 NOTE — DISCHARGE NOTE ADULT - MEDICATION SUMMARY - MEDICATIONS TO TAKE
I will START or STAY ON the medications listed below when I get home from the hospital:    aspirin 81 mg oral delayed release tablet  -- 1 tab(s) by mouth once a day  -- Indication: For CAD (coronary artery disease)    losartan 25 mg oral tablet  -- 1 tab(s) by mouth once a day  -- Indication: For Blood Pressure    allopurinol 100 mg oral tablet  -- 1 tab(s) by mouth 2 times a day  -- Indication: For Gout    pravastatin 40 mg oral tablet  -- 1 tab(s) by mouth once a day (at bedtime)  -- Indication: For High Cholesterol    clopidogrel 75 mg oral tablet  -- 1 tab(s) by mouth once a day  -- Indication: For CAD (coronary artery disease)    labetalol 200 mg oral tablet  -- 1 tab(s) by mouth 3 times a day  -- Indication: For High Cholesterol    Ayr Saline Nasal Gel  -- Apply in each nostril once a day (at bedtime) - to prevent nose bleeds  -- Indication: For Topical agent    Centrum oral tablet  -- 1 tab(s) by mouth once a day  -- Indication: For Vitamin

## 2018-01-26 NOTE — DISCHARGE NOTE ADULT - HOSPITAL COURSE
72M c hx HTN, HLD, BPH, CAD s/p 9x stents (most recently Aug '17), CKD, admission last month for abdominal aortic dissection, p/w severe back pain since last night and bilateral arm numbness; symptoms are similar to those he had last month when aortic dissection diagnosis was made.   . Chronic ischemic heart disease, status post PTCI, no angina, no evidence of ischemia,, normal EKG  2. Back pain now resolved no acute issues.. Chronic aortic issues unchanged  3. Dizziness numbness in the hands with orthostatic hypotension: s/p IV fluids. Pt still orthostatic and wants to be discharged. Labetolol decreased to 200mg TID, Procardia dcd, follow up with Dr. Long on Monday for BP to be rechecked.     Pt asymptomatic, discharged home with outpatient follow up with PMD and Dr. Long (on Monday). 72M c hx HTN, HLD, BPH, CAD s/p 9x stents (most recently Aug '17), CKD, admission last month for abdominal aortic dissection, p/w severe back pain since last night and bilateral arm numbness; symptoms are similar to those he had last month when aortic dissection diagnosis was made.   . Chronic ischemic heart disease, status post PTCI, no angina, no evidence of ischemia,, normal EKG  2. Back pain now resolved no acute issues.. Chronic aortic issues unchanged  3. Dizziness numbness in the hands with orthostatic hypotension: s/p IV fluids. Pt orthostatic improved and wants to be discharged. Labetolol decreased to 200mg TID, Procardia dcd, follow up with Dr. Long on Monday for BP to be rechecked.     Pt asymptomatic, discharged home with outpatient follow up with PMD and Dr. Long (on Monday).

## 2018-01-26 NOTE — PROVIDER CONTACT NOTE (OTHER) - ASSESSMENT
positive orthostatic hypotension  asymptomatic  [id number] HR 76  Sitting 110/63 HR 87  Standing 83/36 HR 87

## 2018-01-26 NOTE — PROGRESS NOTE ADULT - ASSESSMENT
73 yo male history as noted above a/w back pain  back pain now resolved    CTA reviewed  no change in aortic aneurysm/dissection  cont blood pressure control  cards f/u  pain likely due to elevated bp while non compliant with meds    ?orthostatic symptoms  check orthostatic bp  cont home meds    if orthostatics nl, then can dc home with outpt f/u with pmd/cards  dvt ppx  d/w dr. garcia

## 2018-01-26 NOTE — DISCHARGE NOTE ADULT - PLAN OF CARE
Patient asymptomatic. Relieved on its own. Coronary artery disease is a condition where the arteries the supply the heart muscle get clogges with fatty deposits & puts you at risk for a heart attack  Call your doctor if you have any new pain, pressure, or discomfort in the center of your chest, pain, tingling or discomfort in arms, back, neck, jaw, or stomach, shortness of breath, nausea, vomiting, burping or heartburn, sweating, cold and clammy skin, racing or abnormal heartbeat for more than 10 minutes or if they keep coming & going.  Call 911 and do not tr to get to hospital by care  You can help yourself with lefestyle changes (quitting smoking if you smoke), eat lots of fruits & vegetables & low fat dairy products, not a lot of meat & fatty foods, walk or some form of physical activity most days of the week, lose weight if you are overweight  Take your cardiac medication as prescribed to lower cholesterol, to lower blood pressure, aspirin to prevent blood clots, and diabetes control  Make sure to keep appointments with doctor for cardiac follow up care Low salt diet  Activity as tolerated.  Take all medication as prescribed.  Follow up with your medical doctor for routine blood pressure monitoring at your next visit.  Notify your doctor if you have any of the following symptoms:   Dizziness, Lightheadedness, Blurry vision, Headache, Chest pain, Shortness of breath continue with current medication.

## 2018-01-26 NOTE — CONSULT NOTE ADULT - SUBJECTIVE AND OBJECTIVE BOX
CHIEF COMPLAINT:  back pian now dizziness and arm numbness  HISTORY OF PRESENT ILLNESS:  HPI:  72M c hx HTN, HLD, BPH, CAD s/p 9x stents (most recently Aug '17), CKD, admission last month for abdominal aortic dissection, p/w severe back pain since last night and bilateral arm numbness; symptoms are similar to those he had last month when aortic dissection diagnosis was made. Was DC'd on labatelol and nifedipine but has not been fully complaint with the medications b/c at times he feels dizzy or light-headed after taking them. Did not take any meds this morning. (25 Jan 2018 15:05)    since admission back pain is less but he has developed dizziness with change in position, head feels light and some vague numbness in his arms  Repeat blood pressures reveal orthostatic hypotension fluids given with some improvement  There is no chest painor shortness of breath or palpitations  PAST MEDICAL & SURGICAL HISTORY:  Diverticulosis  MI (myocardial infarction)  Diverticulosis  BPH (benign prostatic hyperplasia)  Enlarged aorta  Gout  GERD (gastroesophageal reflux disease)  S/P coronary artery stent placement: 2001 LCx and RCA, 6/2014 &amp; 1/2015  MI, old: 2001  CAD (coronary artery disease)  Hypercholesteremia  HTN (hypertension)  S/P coronary angioplasty: 8 stents  CAD (coronary artery disease): 8 stents  Ureteral stone: Insertion of right  ureteral stent - 9/2015  S/P angioplasty with stent: x 3 ( 12/2001, 6/2014 &amp; 01/2015)  H/O tooth extraction  Cataract: s/p extraction bilat 2/2014          MEDICATIONS:  aspirin enteric coated 81 milliGRAM(s) Oral daily  clopidogrel Tablet 75 milliGRAM(s) Oral daily  heparin  Injectable 5000 Unit(s) SubCutaneous every 8 hours  labetalol 200 milliGRAM(s) Oral three times a day  losartan 25 milliGRAM(s) Oral daily  NIFEdipine XL 30 milliGRAM(s) Oral daily        acetaminophen   Tablet. 650 milliGRAM(s) Oral every 6 hours PRN      allopurinol 100 milliGRAM(s) Oral two times a day after meals  atorvastatin 10 milliGRAM(s) Oral at bedtime    influenza   Vaccine 0.5 milliLiter(s) IntraMuscular once      FAMILY HISTORY:  Family history of CABG: mother with CABG at 79yo  Family history of coronary artery disease (Mother)    l    Allergies    No Known Drug Allergies  strawberry (Hives)    Intolerances    	  PHYSICAL EXAM:  T(C): 36.4 (01-26-18 @ 14:24), Max: 37 (01-25-18 @ 20:12)  HR: 70 (01-26-18 @ 14:24) (70 - 85)  BP: 137/74 (01-26-18 @ 14:24) (118/75 - 155/93)  RR: 16 (01-26-18 @ 14:24) (16 - 18)  SpO2: 96% (01-26-18 @ 14:24) (94% - 96%)  Wt(kg): --  I&O's Summary    26 Jan 2018 07:01  -  26 Jan 2018 17:13  --------------------------------------------------------  IN: 660 mL / OUT: 650 mL / NET: 10 mL        Appearance: Normal, anxious ccomplaining of dizziness with change in position (patient is orthostatic in parentheses	  HEENT:   Normal oral mucosa, PERRL, EOMI	  Cardiovascular: Normal S1 S2, No JVD, No murmurs  Respiratory: Lungs clear to auscultation	  Psychiatry: A & O x 3, Mood & affect appropriate  Gastrointestinal:  Soft, Non-tender, + BS	  Skin: No rashes, No ecchymoses, No cyanosis	  Neurologic: Non-focal  Extremities: No clubbing, cyanosis or edema  Vascular: Peripheral pulses palpable 2+ bilaterally    TELEMETRY: 	    ECG:  < from: 12 Lead ECG (01.25.18 @ 09:35) >    Diagnosis Line NORMAL SINUS RHYTHM  NORMAL ECG    	  RADIOLOGY:  CT chest  < from: CT Angio Chest w/ IV Cont (01.25.18 @ 09:55) >  MPRESSION:     1.  Coronary artery disease with stents within the coronary arterial   tree. Just distal to the stent within the obtuse marginal system (likely   the first obtuse marginal division) is noncalcified plaque causing   significant stenosis (greater than 70%).  2.  No acute intramural hematoma, penetrating aortic ulcer, or aneurysm.  3.  No change in the complex atheromatous plaque/chronic dissection of   the infrarenal abdominal aorta.   4.  No pulmonary embolus.  5.  The findings were discussed with Dr. Ortiz in the emergency   department on 1/25/2018.                  	  	  LABS:	 	    CARDIAC MARKERS:  Troponin T, Serum: <0.01 ng/mL (01-26 @ 05:27)  Troponin T, Serum: <0.01 ng/mL (01-25 @ 18:34)  Troponin T, Serum: <0.01 ng/mL (01-25 @ 09:40)                                  12.3   6.98  )-----------( 143      ( 26 Jan 2018 07:38 )             38.1     01-26    144  |  107  |  19  ----------------------------<  112<H>  5.1   |  24  |  1.34<H>    Ca    9.3      26 Jan 2018 07:38    TPro  8.2  /  Alb  4.7  /  TBili  0.9  /  DBili  x   /  AST  22  /  ALT  25  /  AlkPhos  85  01-25    proBNP: Serum Pro-Brain Natriuretic Peptide: 65 pg/mL (01-25 @ 09:40)    Lipid Profile:   HgA1c:   TSH:

## 2018-01-26 NOTE — CONSULT NOTE ADULT - ASSESSMENT
1. Chronic ischemic heart disease, status post PTCI, no angina, no evidence of ischemia,, normal EKG  2. Back pain now resolved no acute issues.. Chronic aortic issues unchanged  3. Dizziness numbness in the hands with orthostatic hypotension    Recommend  IV fluids, increase by mouth fluids  hold nifedipine  Recheck orthostatics  Discharge home  No further cardiac workup

## 2018-01-27 VITALS
SYSTOLIC BLOOD PRESSURE: 156 MMHG | RESPIRATION RATE: 16 BRPM | DIASTOLIC BLOOD PRESSURE: 72 MMHG | OXYGEN SATURATION: 93 % | TEMPERATURE: 98 F | HEART RATE: 80 BPM

## 2018-01-27 LAB
ANION GAP SERPL CALC-SCNC: 17 MMOL/L — SIGNIFICANT CHANGE UP (ref 5–17)
BUN SERPL-MCNC: 20 MG/DL — SIGNIFICANT CHANGE UP (ref 7–23)
CALCIUM SERPL-MCNC: 9.6 MG/DL — SIGNIFICANT CHANGE UP (ref 8.4–10.5)
CHLORIDE SERPL-SCNC: 104 MMOL/L — SIGNIFICANT CHANGE UP (ref 96–108)
CO2 SERPL-SCNC: 20 MMOL/L — LOW (ref 22–31)
CREAT SERPL-MCNC: 1.21 MG/DL — SIGNIFICANT CHANGE UP (ref 0.5–1.3)
GLUCOSE SERPL-MCNC: 103 MG/DL — HIGH (ref 70–99)
POTASSIUM SERPL-MCNC: 4.2 MMOL/L — SIGNIFICANT CHANGE UP (ref 3.5–5.3)
POTASSIUM SERPL-SCNC: 4.2 MMOL/L — SIGNIFICANT CHANGE UP (ref 3.5–5.3)
SODIUM SERPL-SCNC: 141 MMOL/L — SIGNIFICANT CHANGE UP (ref 135–145)

## 2018-01-27 PROCEDURE — 85610 PROTHROMBIN TIME: CPT

## 2018-01-27 PROCEDURE — 82553 CREATINE MB FRACTION: CPT

## 2018-01-27 PROCEDURE — 76705 ECHO EXAM OF ABDOMEN: CPT

## 2018-01-27 PROCEDURE — 74174 CTA ABD&PLVS W/CONTRAST: CPT

## 2018-01-27 PROCEDURE — 80053 COMPREHEN METABOLIC PANEL: CPT

## 2018-01-27 PROCEDURE — 71045 X-RAY EXAM CHEST 1 VIEW: CPT

## 2018-01-27 PROCEDURE — 80048 BASIC METABOLIC PNL TOTAL CA: CPT

## 2018-01-27 PROCEDURE — 93005 ELECTROCARDIOGRAM TRACING: CPT

## 2018-01-27 PROCEDURE — 85014 HEMATOCRIT: CPT

## 2018-01-27 PROCEDURE — 71275 CT ANGIOGRAPHY CHEST: CPT

## 2018-01-27 PROCEDURE — 82435 ASSAY OF BLOOD CHLORIDE: CPT

## 2018-01-27 PROCEDURE — 84132 ASSAY OF SERUM POTASSIUM: CPT

## 2018-01-27 PROCEDURE — 82330 ASSAY OF CALCIUM: CPT

## 2018-01-27 PROCEDURE — 84484 ASSAY OF TROPONIN QUANT: CPT

## 2018-01-27 PROCEDURE — 82803 BLOOD GASES ANY COMBINATION: CPT

## 2018-01-27 PROCEDURE — 84295 ASSAY OF SERUM SODIUM: CPT

## 2018-01-27 PROCEDURE — 83605 ASSAY OF LACTIC ACID: CPT

## 2018-01-27 PROCEDURE — 85027 COMPLETE CBC AUTOMATED: CPT

## 2018-01-27 PROCEDURE — 83880 ASSAY OF NATRIURETIC PEPTIDE: CPT

## 2018-01-27 PROCEDURE — 85730 THROMBOPLASTIN TIME PARTIAL: CPT

## 2018-01-27 PROCEDURE — 82550 ASSAY OF CK (CPK): CPT

## 2018-01-27 PROCEDURE — 82947 ASSAY GLUCOSE BLOOD QUANT: CPT

## 2018-01-27 PROCEDURE — 99285 EMERGENCY DEPT VISIT HI MDM: CPT | Mod: 25

## 2018-01-27 PROCEDURE — 96374 THER/PROPH/DIAG INJ IV PUSH: CPT

## 2018-01-27 RX ORDER — LABETALOL HCL 100 MG
1 TABLET ORAL
Qty: 90 | Refills: 0 | OUTPATIENT
Start: 2018-01-27 | End: 2018-02-25

## 2018-01-27 RX ADMIN — Medication 100 MILLIGRAM(S): at 18:32

## 2018-01-27 RX ADMIN — Medication 200 MILLIGRAM(S): at 13:45

## 2018-01-27 RX ADMIN — HEPARIN SODIUM 5000 UNIT(S): 5000 INJECTION INTRAVENOUS; SUBCUTANEOUS at 13:45

## 2018-01-27 RX ADMIN — Medication 200 MILLIGRAM(S): at 05:51

## 2018-01-27 RX ADMIN — Medication 81 MILLIGRAM(S): at 13:40

## 2018-01-27 RX ADMIN — LOSARTAN POTASSIUM 25 MILLIGRAM(S): 100 TABLET, FILM COATED ORAL at 05:51

## 2018-01-27 RX ADMIN — Medication 100 MILLIGRAM(S): at 08:40

## 2018-01-27 RX ADMIN — CLOPIDOGREL BISULFATE 75 MILLIGRAM(S): 75 TABLET, FILM COATED ORAL at 13:40

## 2018-01-27 RX ADMIN — Medication 100 MILLIGRAM(S): at 05:54

## 2018-03-14 ENCOUNTER — EMERGENCY (EMERGENCY)
Facility: HOSPITAL | Age: 73
LOS: 1 days | Discharge: ANOTHER HOSPITAL | End: 2018-03-14
Attending: EMERGENCY MEDICINE | Admitting: EMERGENCY MEDICINE
Payer: MEDICARE

## 2018-03-14 ENCOUNTER — INPATIENT (INPATIENT)
Facility: HOSPITAL | Age: 73
LOS: 0 days | Discharge: HOME | DRG: 287 | End: 2018-03-15
Attending: GENERAL PRACTICE | Admitting: GENERAL PRACTICE
Payer: MEDICARE

## 2018-03-14 VITALS
DIASTOLIC BLOOD PRESSURE: 92 MMHG | TEMPERATURE: 98 F | HEART RATE: 85 BPM | SYSTOLIC BLOOD PRESSURE: 144 MMHG | RESPIRATION RATE: 17 BRPM | WEIGHT: 164.91 LBS | OXYGEN SATURATION: 98 % | HEIGHT: 68 IN

## 2018-03-14 VITALS
SYSTOLIC BLOOD PRESSURE: 196 MMHG | WEIGHT: 164.91 LBS | HEART RATE: 66 BPM | OXYGEN SATURATION: 98 % | HEIGHT: 68 IN | RESPIRATION RATE: 20 BRPM | DIASTOLIC BLOOD PRESSURE: 94 MMHG

## 2018-03-14 VITALS
DIASTOLIC BLOOD PRESSURE: 95 MMHG | SYSTOLIC BLOOD PRESSURE: 168 MMHG | RESPIRATION RATE: 18 BRPM | OXYGEN SATURATION: 98 % | HEART RATE: 81 BPM

## 2018-03-14 DIAGNOSIS — I20.0 UNSTABLE ANGINA: ICD-10-CM

## 2018-03-14 DIAGNOSIS — Z98.89 OTHER SPECIFIED POSTPROCEDURAL STATES: Chronic | ICD-10-CM

## 2018-03-14 DIAGNOSIS — N20.1 CALCULUS OF URETER: Chronic | ICD-10-CM

## 2018-03-14 DIAGNOSIS — K08.409 PARTIAL LOSS OF TEETH, UNSPECIFIED CAUSE, UNSPECIFIED CLASS: Chronic | ICD-10-CM

## 2018-03-14 DIAGNOSIS — Z98.61 CORONARY ANGIOPLASTY STATUS: Chronic | ICD-10-CM

## 2018-03-14 DIAGNOSIS — H26.9 UNSPECIFIED CATARACT: Chronic | ICD-10-CM

## 2018-03-14 DIAGNOSIS — I25.10 ATHEROSCLEROTIC HEART DISEASE OF NATIVE CORONARY ARTERY WITHOUT ANGINA PECTORIS: Chronic | ICD-10-CM

## 2018-03-14 LAB
ALBUMIN SERPL ELPH-MCNC: 3.9 G/DL — SIGNIFICANT CHANGE UP (ref 3.3–5)
ALP SERPL-CCNC: 81 U/L — SIGNIFICANT CHANGE UP (ref 40–120)
ALT FLD-CCNC: 29 U/L — SIGNIFICANT CHANGE UP (ref 12–78)
ANION GAP SERPL CALC-SCNC: 9 MMOL/L — SIGNIFICANT CHANGE UP (ref 5–17)
APTT BLD: 26 SEC — LOW (ref 27.5–37.4)
AST SERPL-CCNC: 29 U/L — SIGNIFICANT CHANGE UP (ref 15–37)
BASOPHILS # BLD AUTO: 0.1 K/UL — SIGNIFICANT CHANGE UP (ref 0–0.2)
BASOPHILS NFR BLD AUTO: 1.4 % — SIGNIFICANT CHANGE UP (ref 0–2)
BILIRUB SERPL-MCNC: 1.2 MG/DL — SIGNIFICANT CHANGE UP (ref 0.2–1.2)
BUN SERPL-MCNC: 17 MG/DL — SIGNIFICANT CHANGE UP (ref 7–23)
CALCIUM SERPL-MCNC: 9.1 MG/DL — SIGNIFICANT CHANGE UP (ref 8.5–10.1)
CHLORIDE SERPL-SCNC: 107 MMOL/L — SIGNIFICANT CHANGE UP (ref 96–108)
CK MB BLD-MCNC: 1 % — SIGNIFICANT CHANGE UP (ref 0–3.5)
CK MB CFR SERPL CALC: 1.3 NG/ML — SIGNIFICANT CHANGE UP (ref 0–3.6)
CK SERPL-CCNC: 124 U/L — SIGNIFICANT CHANGE UP (ref 26–308)
CO2 SERPL-SCNC: 23 MMOL/L — SIGNIFICANT CHANGE UP (ref 22–31)
CREAT SERPL-MCNC: 1.2 MG/DL — SIGNIFICANT CHANGE UP (ref 0.5–1.3)
EOSINOPHIL # BLD AUTO: 0.1 K/UL — SIGNIFICANT CHANGE UP (ref 0–0.5)
EOSINOPHIL NFR BLD AUTO: 1.4 % — SIGNIFICANT CHANGE UP (ref 0–6)
GLUCOSE SERPL-MCNC: 118 MG/DL — HIGH (ref 70–99)
HCT VFR BLD CALC: 40.1 % — SIGNIFICANT CHANGE UP (ref 39–50)
HGB BLD-MCNC: 13.5 G/DL — SIGNIFICANT CHANGE UP (ref 13–17)
INR BLD: 1.1 RATIO — SIGNIFICANT CHANGE UP (ref 0.88–1.16)
LIDOCAIN IGE QN: 217 U/L — SIGNIFICANT CHANGE UP (ref 73–393)
LYMPHOCYTES # BLD AUTO: 0.7 K/UL — LOW (ref 1–3.3)
LYMPHOCYTES # BLD AUTO: 11.7 % — LOW (ref 13–44)
MCHC RBC-ENTMCNC: 30.8 PG — SIGNIFICANT CHANGE UP (ref 27–34)
MCHC RBC-ENTMCNC: 33.6 GM/DL — SIGNIFICANT CHANGE UP (ref 32–36)
MCV RBC AUTO: 91.7 FL — SIGNIFICANT CHANGE UP (ref 80–100)
MONOCYTES # BLD AUTO: 0.5 K/UL — SIGNIFICANT CHANGE UP (ref 0–0.9)
MONOCYTES NFR BLD AUTO: 8.1 % — SIGNIFICANT CHANGE UP (ref 1–9)
NEUTROPHILS # BLD AUTO: 4.6 K/UL — SIGNIFICANT CHANGE UP (ref 1.8–7.4)
NEUTROPHILS NFR BLD AUTO: 77.4 % — HIGH (ref 43–77)
NT-PROBNP SERPL-SCNC: 369 PG/ML — HIGH (ref 0–125)
PLATELET # BLD AUTO: 151 K/UL — SIGNIFICANT CHANGE UP (ref 150–400)
POTASSIUM SERPL-MCNC: 4.4 MMOL/L — SIGNIFICANT CHANGE UP (ref 3.5–5.3)
POTASSIUM SERPL-SCNC: 4.4 MMOL/L — SIGNIFICANT CHANGE UP (ref 3.5–5.3)
PROT SERPL-MCNC: 7.7 G/DL — SIGNIFICANT CHANGE UP (ref 6–8.3)
PROTHROM AB SERPL-ACNC: 12 SEC — SIGNIFICANT CHANGE UP (ref 9.8–12.7)
RBC # BLD: 4.37 M/UL — SIGNIFICANT CHANGE UP (ref 4.2–5.8)
RBC # FLD: 13.8 % — SIGNIFICANT CHANGE UP (ref 10.3–14.5)
SODIUM SERPL-SCNC: 139 MMOL/L — SIGNIFICANT CHANGE UP (ref 135–145)
TROPONIN I SERPL-MCNC: <.015 NG/ML — SIGNIFICANT CHANGE UP (ref 0.01–0.04)
WBC # BLD: 6 K/UL — SIGNIFICANT CHANGE UP (ref 3.8–10.5)
WBC # FLD AUTO: 6 K/UL — SIGNIFICANT CHANGE UP (ref 3.8–10.5)

## 2018-03-14 PROCEDURE — 85730 THROMBOPLASTIN TIME PARTIAL: CPT

## 2018-03-14 PROCEDURE — 93005 ELECTROCARDIOGRAM TRACING: CPT

## 2018-03-14 PROCEDURE — 83880 ASSAY OF NATRIURETIC PEPTIDE: CPT

## 2018-03-14 PROCEDURE — 82550 ASSAY OF CK (CPK): CPT

## 2018-03-14 PROCEDURE — 83690 ASSAY OF LIPASE: CPT

## 2018-03-14 PROCEDURE — 85027 COMPLETE CBC AUTOMATED: CPT

## 2018-03-14 PROCEDURE — 71046 X-RAY EXAM CHEST 2 VIEWS: CPT | Mod: 26

## 2018-03-14 PROCEDURE — 80053 COMPREHEN METABOLIC PANEL: CPT

## 2018-03-14 PROCEDURE — 93010 ELECTROCARDIOGRAM REPORT: CPT

## 2018-03-14 PROCEDURE — 99285 EMERGENCY DEPT VISIT HI MDM: CPT

## 2018-03-14 PROCEDURE — 71046 X-RAY EXAM CHEST 2 VIEWS: CPT

## 2018-03-14 PROCEDURE — 82553 CREATINE MB FRACTION: CPT

## 2018-03-14 PROCEDURE — 99285 EMERGENCY DEPT VISIT HI MDM: CPT | Mod: 25

## 2018-03-14 PROCEDURE — 84484 ASSAY OF TROPONIN QUANT: CPT

## 2018-03-14 PROCEDURE — 85610 PROTHROMBIN TIME: CPT

## 2018-03-14 RX ORDER — CLOPIDOGREL BISULFATE 75 MG/1
75 TABLET, FILM COATED ORAL DAILY
Qty: 0 | Refills: 0 | Status: DISCONTINUED | OUTPATIENT
Start: 2018-03-14 | End: 2018-03-15

## 2018-03-14 RX ORDER — ZALEPLON 10 MG
5 CAPSULE ORAL ONCE
Qty: 0 | Refills: 0 | Status: DISCONTINUED | OUTPATIENT
Start: 2018-03-14 | End: 2018-03-15

## 2018-03-14 RX ORDER — ATORVASTATIN CALCIUM 80 MG/1
10 TABLET, FILM COATED ORAL AT BEDTIME
Qty: 0 | Refills: 0 | Status: DISCONTINUED | OUTPATIENT
Start: 2018-03-14 | End: 2018-03-15

## 2018-03-14 RX ORDER — SODIUM CHLORIDE 0.65 %
0 AEROSOL, SPRAY (ML) NASAL
Qty: 0 | Refills: 0 | COMMUNITY

## 2018-03-14 RX ORDER — ASPIRIN/CALCIUM CARB/MAGNESIUM 324 MG
81 TABLET ORAL DAILY
Qty: 0 | Refills: 0 | Status: DISCONTINUED | OUTPATIENT
Start: 2018-03-14 | End: 2018-03-15

## 2018-03-14 RX ORDER — SODIUM CHLORIDE 9 MG/ML
3 INJECTION INTRAMUSCULAR; INTRAVENOUS; SUBCUTANEOUS ONCE
Qty: 0 | Refills: 0 | Status: COMPLETED | OUTPATIENT
Start: 2018-03-14 | End: 2018-03-14

## 2018-03-14 RX ORDER — ASPIRIN/CALCIUM CARB/MAGNESIUM 324 MG
325 TABLET ORAL ONCE
Qty: 0 | Refills: 0 | Status: COMPLETED | OUTPATIENT
Start: 2018-03-14 | End: 2018-03-14

## 2018-03-14 RX ORDER — ALLOPURINOL 300 MG
100 TABLET ORAL THREE TIMES A DAY
Qty: 0 | Refills: 0 | Status: DISCONTINUED | OUTPATIENT
Start: 2018-03-14 | End: 2018-03-15

## 2018-03-14 RX ORDER — HYDRALAZINE HCL 50 MG
25 TABLET ORAL ONCE
Qty: 0 | Refills: 0 | Status: COMPLETED | OUTPATIENT
Start: 2018-03-14 | End: 2018-03-14

## 2018-03-14 RX ORDER — MULTIVIT-MIN/FERROUS GLUCONATE 9 MG/15 ML
1 LIQUID (ML) ORAL
Qty: 0 | Refills: 0 | COMMUNITY

## 2018-03-14 RX ORDER — ALLOPURINOL 300 MG
1 TABLET ORAL
Qty: 0 | Refills: 0 | COMMUNITY

## 2018-03-14 RX ORDER — SODIUM CHLORIDE 9 MG/ML
1000 INJECTION INTRAMUSCULAR; INTRAVENOUS; SUBCUTANEOUS
Qty: 0 | Refills: 0 | Status: DISCONTINUED | OUTPATIENT
Start: 2018-03-14 | End: 2018-03-18

## 2018-03-14 RX ORDER — LOSARTAN POTASSIUM 100 MG/1
25 TABLET, FILM COATED ORAL DAILY
Qty: 0 | Refills: 0 | Status: DISCONTINUED | OUTPATIENT
Start: 2018-03-14 | End: 2018-03-15

## 2018-03-14 RX ORDER — LABETALOL HCL 100 MG
200 TABLET ORAL THREE TIMES A DAY
Qty: 0 | Refills: 0 | Status: DISCONTINUED | OUTPATIENT
Start: 2018-03-14 | End: 2018-03-15

## 2018-03-14 RX ADMIN — SODIUM CHLORIDE 3 MILLILITER(S): 9 INJECTION INTRAMUSCULAR; INTRAVENOUS; SUBCUTANEOUS at 11:32

## 2018-03-14 RX ADMIN — SODIUM CHLORIDE 125 MILLILITER(S): 9 INJECTION INTRAMUSCULAR; INTRAVENOUS; SUBCUTANEOUS at 11:30

## 2018-03-14 RX ADMIN — ATORVASTATIN CALCIUM 10 MILLIGRAM(S): 80 TABLET, FILM COATED ORAL at 22:19

## 2018-03-14 RX ADMIN — Medication 100 MILLIGRAM(S): at 22:19

## 2018-03-14 RX ADMIN — Medication 325 MILLIGRAM(S): at 11:30

## 2018-03-14 RX ADMIN — Medication 200 MILLIGRAM(S): at 22:19

## 2018-03-14 NOTE — H&P CARDIOLOGY - FAMILY HISTORY
Family history of CABG, mother with CABG at 81yo     Mother  Still living? Unknown  Family history of coronary artery disease, Age at diagnosis: Age Unknown

## 2018-03-14 NOTE — H&P CARDIOLOGY - ATTENDING COMMENTS
Patient evaluated, case discussed with me, chart reviewed, agree with above H/P as reviewed and edited by me.  discussed with cardio: plan for cath

## 2018-03-14 NOTE — ED ADULT NURSE NOTE - OBJECTIVE STATEMENT
1000 Patient is a and o x3. no acute distress noted. Placed on cardiac monitor. MSpencer 1000 Patient is a and o x3. no acute distress noted. Placed on cardiac monitor. MSpencer  1530 Report given to cath lab due to unstable angina. Patient continues to be monitored. MSpencer

## 2018-03-14 NOTE — H&P CARDIOLOGY - HISTORY OF PRESENT ILLNESS
This is a 72 y.o. M w/ a PMH of CAD (sp. 9 stents), HTN (labetol, losartan), GERD, Gout, BPH, previous MI in 2001 presents to the [place] ED w/ dizziness, light-headedness and CP x 24 hrs.  Patient reports that increasing SOB w/ exertion over the past few days, increasing yesterday to SOB w/ dizziness, lightheadedness and tingling over his entire body.  He reports that this morning these symptoms were accompanied by substernal chest pain that radiates to his back.  He reports that symptoms were aggravated by walking, but continued at rest.  He denies recent illness, fever, sore throat, cough, palpitations, sweating, nausea, heart burn, vomiting, diarrhea, change in stool color, leg pain or leg swelling.  Additionally he reports that he has been needing more pillows in order to rest comfortably at night, trops negative x 1, No acute changes on EKG compared to previous,  CXR is clear, Previous ECHO in 12/17 shows normal LV function with EF: 65%, no significant valvular disease noted, transferred to [place] for catheterization given extensive history of stents and progressive worsening symptoms to rule out obstructive CAD This is a 72 y.o. M w/ a PMH of CAD (sp. 9 stents), HTN (labetol, losartan), GERD, Gout, BPH, previous MI in 2001 presents to the [place] ED w/ dizziness, light-headedness and CP x 24 hrs.    Patient reports that increasing SOB w/ exertion over the past few days, increasing yesterday to SOB w/ dizziness, lightheadedness and tingling over his entire body.  He reports that this morning these symptoms were accompanied by substernal chest pain that radiates to his back.    He reports that symptoms were aggravated by walking, but continued at rest.   He denies recent illness, fever, sore throat, cough, palpitations, sweating, nausea, heart burn, vomiting, diarrhea, change in stool color, leg pain or leg swelling.    Additionally he reports that he has been needing more pillows in order to rest comfortably at night, trops negative x 1, No acute changes on EKG compared to previous,  CXR is clear, Previous ECHO in 12/17 shows normal LV function with EF: 65%, no significant valvular disease noted, transferred to [place] for catheterization given extensive history of stents and progressive worsening symptoms to rule out obstructive CAD

## 2018-03-14 NOTE — CONSULT NOTE ADULT - ASSESSMENT
This is a 72 y.o. M w/ a PMH of CAD (sp. 9 stents), HTN (labetol, losartan), GERD, Gout, BPH, previous MI in 2001 presents to the ED w/ dizziness, light-headedness and CP x 24 hrs.     Doubt ACS, pain w/ exertion, relieved w/ rest is consistent w/ stable angina.    - No clear evidence of acute ischemia, trops negative x 1.  - No evidence of volume overload: CXR is clear, and no LE edema  - No acute changes on EKG compared to previous  - Previous ECHO in 12/17 shows normal LV function with EF: 65%, no significant valvular disease noted  - BP well controlled, continue home BP meds, monitor routine hemodynamics  - Monitor and replete lytes, keep K>4, Mg>2  -   - Other cardiovascular workup will depend on clinical course.  - All other workup per primary team  - Will follow This is a 72 y.o. M w/ a PMH of CAD (sp. 9 stents), HTN (labetol, losartan), GERD, Gout, BPH, previous MI in 2001 presents to the ED w/ dizziness, light-headedness and CP x 24 hrs.     Progressive chest pain w/ exertion is consistent w/ unstable angina.    - No clear evidence of acute ischemia, trops negative x 1.  - No evidence of volume overload: CXR is clear, and no LE edema  - No acute changes on EKG compared to previous  - Previous ECHO in 12/17 shows normal LV function with EF: 65%, no significant valvular disease noted  - BP well controlled, continue home BP meds, monitor routine hemodynamics    - spoke w/ Dr. Long who is the patient's cardiologist, he agrees to transfer to [place] for catheterization given extensive history of stents and progressive worsening symptoms. This is a 72 y.o. M w/ a PMH of CAD (sp. 9 stents), HTN (labetol, losartan), GERD, Gout, BPH, previous MI in 2001 presents to the ED w/ dizziness, light-headedness and CP x 24 hrs.     Progressive chest pain w/ exertion is consistent w/ unstable angina.    - No clear evidence of acute ischemia, trops negative x 1.  - No evidence of volume overload: CXR is clear, and no LE edema  - No acute changes on EKG compared to previous  - Previous ECHO in 12/17 shows normal LV function with EF: 65%, no significant valvular disease noted  - BP well controlled, continue home BP meds, monitor routine hemodynamics    - spoke w/ Dr. Long who is the patient's cardiologist, he agrees to transfer to [facility] for catheterization given extensive history of stents and progressive worsening symptoms to rule out obstructive CAD

## 2018-03-14 NOTE — ED PROVIDER NOTE - OBJECTIVE STATEMENT
sudden onset of moderate severity chest pain since this morning, resolves when at rest, some exertional dyspnea, 9 stents      PMD: Wiley Guajardo ([place])  Cards: Kraig Long

## 2018-03-14 NOTE — ED PROVIDER NOTE - PROGRESS NOTE DETAILS
Pt being transferred to [facility] for cardiac cath, accepting is Dr. Kraig Long.

## 2018-03-15 ENCOUNTER — TRANSCRIPTION ENCOUNTER (OUTPATIENT)
Age: 73
End: 2018-03-15

## 2018-03-15 VITALS
OXYGEN SATURATION: 97 % | TEMPERATURE: 98 F | SYSTOLIC BLOOD PRESSURE: 118 MMHG | DIASTOLIC BLOOD PRESSURE: 83 MMHG | HEART RATE: 87 BPM | RESPIRATION RATE: 18 BRPM

## 2018-03-15 LAB
ANION GAP SERPL CALC-SCNC: 12 MMOL/L — SIGNIFICANT CHANGE UP (ref 5–17)
BUN SERPL-MCNC: 19 MG/DL — SIGNIFICANT CHANGE UP (ref 7–23)
CALCIUM SERPL-MCNC: 9.5 MG/DL — SIGNIFICANT CHANGE UP (ref 8.4–10.5)
CHLORIDE SERPL-SCNC: 103 MMOL/L — SIGNIFICANT CHANGE UP (ref 96–108)
CO2 SERPL-SCNC: 26 MMOL/L — SIGNIFICANT CHANGE UP (ref 22–31)
CREAT SERPL-MCNC: 1.2 MG/DL — SIGNIFICANT CHANGE UP (ref 0.5–1.3)
GLUCOSE SERPL-MCNC: 100 MG/DL — HIGH (ref 70–99)
HCT VFR BLD CALC: 39.9 % — SIGNIFICANT CHANGE UP (ref 39–50)
HGB BLD-MCNC: 13.4 G/DL — SIGNIFICANT CHANGE UP (ref 13–17)
MCHC RBC-ENTMCNC: 31.8 PG — SIGNIFICANT CHANGE UP (ref 27–34)
MCHC RBC-ENTMCNC: 33.6 GM/DL — SIGNIFICANT CHANGE UP (ref 32–36)
MCV RBC AUTO: 94.7 FL — SIGNIFICANT CHANGE UP (ref 80–100)
PLATELET # BLD AUTO: 137 K/UL — LOW (ref 150–400)
POTASSIUM SERPL-MCNC: 4.1 MMOL/L — SIGNIFICANT CHANGE UP (ref 3.5–5.3)
POTASSIUM SERPL-SCNC: 4.1 MMOL/L — SIGNIFICANT CHANGE UP (ref 3.5–5.3)
RBC # BLD: 4.21 M/UL — SIGNIFICANT CHANGE UP (ref 4.2–5.8)
RBC # FLD: 13.4 % — SIGNIFICANT CHANGE UP (ref 10.3–14.5)
SODIUM SERPL-SCNC: 141 MMOL/L — SIGNIFICANT CHANGE UP (ref 135–145)
WBC # BLD: 7.1 K/UL — SIGNIFICANT CHANGE UP (ref 3.8–10.5)
WBC # FLD AUTO: 7.1 K/UL — SIGNIFICANT CHANGE UP (ref 3.8–10.5)

## 2018-03-15 PROCEDURE — 85027 COMPLETE CBC AUTOMATED: CPT

## 2018-03-15 PROCEDURE — 93567 NJX CAR CTH SPRVLV AORTGRPHY: CPT

## 2018-03-15 PROCEDURE — 93005 ELECTROCARDIOGRAM TRACING: CPT

## 2018-03-15 PROCEDURE — 93458 L HRT ARTERY/VENTRICLE ANGIO: CPT

## 2018-03-15 PROCEDURE — 99152 MOD SED SAME PHYS/QHP 5/>YRS: CPT

## 2018-03-15 PROCEDURE — 80048 BASIC METABOLIC PNL TOTAL CA: CPT

## 2018-03-15 RX ORDER — HYDRALAZINE HCL 50 MG
10 TABLET ORAL EVERY 8 HOURS
Qty: 0 | Refills: 0 | Status: DISCONTINUED | OUTPATIENT
Start: 2018-03-15 | End: 2018-03-15

## 2018-03-15 RX ORDER — ISOSORBIDE MONONITRATE 60 MG/1
30 TABLET, EXTENDED RELEASE ORAL DAILY
Qty: 0 | Refills: 0 | Status: DISCONTINUED | OUTPATIENT
Start: 2018-03-15 | End: 2018-03-15

## 2018-03-15 RX ORDER — METOPROLOL TARTRATE 50 MG
100 TABLET ORAL DAILY
Qty: 0 | Refills: 0 | Status: DISCONTINUED | OUTPATIENT
Start: 2018-03-15 | End: 2018-03-15

## 2018-03-15 RX ADMIN — Medication 25 MILLIGRAM(S): at 00:05

## 2018-03-15 RX ADMIN — Medication 100 MILLIGRAM(S): at 05:50

## 2018-03-15 RX ADMIN — ISOSORBIDE MONONITRATE 30 MILLIGRAM(S): 60 TABLET, EXTENDED RELEASE ORAL at 12:54

## 2018-03-15 RX ADMIN — Medication 200 MILLIGRAM(S): at 05:50

## 2018-03-15 RX ADMIN — CLOPIDOGREL BISULFATE 75 MILLIGRAM(S): 75 TABLET, FILM COATED ORAL at 05:50

## 2018-03-15 RX ADMIN — Medication 100 MILLIGRAM(S): at 13:26

## 2018-03-15 RX ADMIN — LOSARTAN POTASSIUM 25 MILLIGRAM(S): 100 TABLET, FILM COATED ORAL at 05:50

## 2018-03-15 RX ADMIN — Medication 5 MILLIGRAM(S): at 00:05

## 2018-03-15 RX ADMIN — Medication 81 MILLIGRAM(S): at 05:50

## 2018-03-15 RX ADMIN — Medication 10 MILLIGRAM(S): at 13:26

## 2018-03-15 NOTE — DISCHARGE NOTE ADULT - MEDICATION SUMMARY - MEDICATIONS TO TAKE
I will START or STAY ON the medications listed below when I get home from the hospital:    aspirin 81 mg oral delayed release tablet  -- 1 tab(s) by mouth once a day home /hosp  -- Indication: For Heart protection/CAD    losartan 25 mg oral tablet  -- 1 tab(s) by mouth once a day home & hosp  -- Indication: For High blood pressure    allopurinol 100 mg oral tablet  -- 1 tab(s) by mouth 3 times a day home/hosp  -- Indication: For Gout    pravastatin 40 mg oral tablet  -- 1 tab(s) by mouth once a day (at bedtime) home  -- Indication: For High cholesterol    clopidogrel 75 mg oral tablet  -- 1 tab(s) by mouth once a day hosp  -- Indication: For CAD/heart protection    labetalol 200 mg oral tablet  -- 1 tab(s) by mouth 3 times a day home /hosp  -- Indication: For high blood pressure

## 2018-03-15 NOTE — DISCHARGE NOTE ADULT - HOSPITAL COURSE
72 y.o. M w/ a PMH of CAD (sp. 9 stents), HTN (labetol, losartan), GERD, Gout, BPH, previous MI in 2001 presented to the [place] ED w/ dizziness, light-headedness and CP x 24 hrs.    Patient reports that increasing SOB w/ exertion over the past few days, increasing yesterday to SOB w/ dizziness, lightheadedness and tingling over his entire body.  He reports that this morning these symptoms were accompanied by substernal chest pain that radiates to his back.    He reports that symptoms were aggravated by walking, but continued at rest.  The patient was transferred to [facility] for cath with Dr. Long on 3/14/18. The cardiac angiogram revealed patent stents and no obstructive CAD. The patient was started on new blood pressure medications of toprol 100mg, and hydralazine 10mg for better blood pressure control. The patient was advised to stay one more day to observe how he reacts to the new medications. However, the patient would like to go home and Dr. Kramer states the patient may go home but would need his BP meds adjusted by his cardiologist when he follows up.

## 2018-03-15 NOTE — DISCHARGE NOTE ADULT - PATIENT PORTAL LINK FT
You can access the [facility] Patient Portal, offered by [facility], by registering with the following website: [email or web address]

## 2018-03-15 NOTE — DISCHARGE NOTE ADULT - PLAN OF CARE
You will be free of chest pain or shortness of breath. Continue your medications. Do not stop Aspirin or Plavix unless instructed by your cardiologist.  No heavy lifting, strenuous activity, bending, straining or unnecessary stair climbing for 2 weeks. No sex for 1 week.  No driving for 2 days. You may shower 24 hours following procedure but avoid baths and swimming for 1 week. Check groin site for bleeding and/or swelling daily following procedure. Call your doctor/cardiologist immediately for bleeding or swelling or if you have increased/persistent pain or drainage at the site. Follow up with your cardiologist in 1- 2 weeks. You may call [facility] at [phone] or [phone] after office hours and weekends with any questions or concerns following your procedure.

## 2018-03-15 NOTE — DISCHARGE NOTE ADULT - ADDITIONAL INSTRUCTIONS
Please follow up with Dr. Neetu Chavis in 1-2 weeks. Please take your medications as prescribed. If you feel any further chest pain or shortness of breath please proceed to the emergency room.

## 2018-03-15 NOTE — DISCHARGE NOTE ADULT - CARE PROVIDER_API CALL
Ez Chavis), Cardiovascular Disease; Internal Medicine  [id number] [address] [place] [address]  Phone: [phone]  Fax: [phone]

## 2018-03-15 NOTE — PROGRESS NOTE ADULT - SUBJECTIVE AND OBJECTIVE BOX
_________________________________________________________________________________________  ========>>  M E D I C A L   A T T E N D I N G    F O L L O W  U P  N O T E  <<=========  -----------------------------------------------------------------------------------------------------    - Patient seen and examined by me approximately thirty minutes ago.  - In summary, patient is a 72y year old man who originally presented with chest pain and SOB  - Patient today overall doing ok, comfortable, eating OK.  + PALM    ==================>> REVIEW OF SYSTEM <<=================    GEN: no fever, no chills, no pain  RESP: no SOB at rest, no cough, no sputum  CVS: no chest pain at rest , no palpitations, no edema  GI: no abdominal pain, no nausea, no constipation, no diarrhea  : no dysuria, no frequency, no hematuria  Neuro: no headache, no dizziness  Derm : no itching, no rash    ==================>> PHYSICAL EXAM <<=================    GEN: A&O X 3 , NAD , comfortable  HEENT: NCAT, PERRL, MMM, hearing intact  Neck: supple , no JVD  CVS: S1S2 , regular , No M/R/G appreciated  PULM: CTA B/L,  no W/R/R appreciated  ABD.: soft. non tender, non distended,  bowel sounds present  Extrem: intact pulses , no edema   PSYCH : normal mood,  not anxious      ==================>> MEDICATIONS <<====================    MEDICATIONS  (STANDING):  allopurinol 100 milliGRAM(s) Oral three times a day  aspirin enteric coated 81 milliGRAM(s) Oral daily  atorvastatin 10 milliGRAM(s) Oral at bedtime  clopidogrel Tablet 75 milliGRAM(s) Oral daily  labetalol 200 milliGRAM(s) Oral three times a day  losartan 25 milliGRAM(s) Oral daily    ==================>> VITAL SIGNS <<==================    T(C): 36.8 (03-15-18 @ 08:53), Max: 36.8 (03-15-18 @ 08:53)  HR: 67 (03-15-18 @ 08:53) (57 - 90)  BP: 160/79 (03-15-18 @ 08:53) (158/78 - 196/94)  RR: 18 (03-15-18 @ 08:53) (17 - 20)  SpO2: 96% (03-15-18 @ 08:53) (96% - 100%)    I&O's Summary    14 Mar 2018 07:01  -  15 Mar 2018 07:00  --------------------------------------------------------  IN: 480 mL / OUT: 0 mL / NET: 480 mL    15 Mar 2018 07:01  -  15 Mar 2018 10:39  --------------------------------------------------------  IN: 0 mL / OUT: 0 mL / NET: 0 mL     ==================>> LAB AND IMAGING <<==================                        13.4   7.1   )-----------( 137      ( 15 Mar 2018 00:56 )             39.9        03-15    141  |  103  |  19  ----------------------------<  100<H>  4.1   |  26  |  1.20    Creatinine:  1.20  <<==, 1.20  <<==    Ca    9.5      15 Mar 2018 00:56    TPro  7.7  /  Alb  3.9  /  TBili  1.2  /  DBili  x   /  AST  29  /  ALT  29  /  AlkPhos  81  03-14    PT/INR - ( 14 Mar 2018 10:23 )   PT: 12.0 sec;   INR: 1.10 ratio    PTT - ( 14 Mar 2018 10:23 )  PTT:26.0 sec              CARDIAC MARKERS ( 14 Mar 2018 10:23 )  <.015 ng/mL / x     / 124 U/L / x     / 1.3 ng/mL  ___________________________________________________________________________________  ===============>>  A S S E S S M E N T   A N D   P L A N <<===============  ------------------------------------------------------------------------------------------    **pt with HTN, CAD, multiple stents, Aortic ?dissection history, presents with ACS symptoms as above  ---pan for cardiac cath today  ---need better BP optimization  ---continue labetalol >> ? will consider change to BID dosing (given side effects), and increasing Losartan or add Nitrates  ---cardio f/u: Dr Long  ---Telemetry monitoring  ---monitor vitals closely    **HLD, gout, GERD, BPH  --- Continue Current medications and monitor.     -GI/DVT Prophylaxis.    --------------------------------------------  Case discussed with pt, will d/w cardio  Education given on findings and plan of care  ___________________________  HRaza Kramer D.O.  Pager: [phone]

## 2018-03-15 NOTE — DISCHARGE NOTE ADULT - CARE PLAN
Principal Discharge DX:	Unstable angina  Goal:	You will be free of chest pain or shortness of breath.  Assessment and plan of treatment:	Continue your medications. Do not stop Aspirin or Plavix unless instructed by your cardiologist.  No heavy lifting, strenuous activity, bending, straining or unnecessary stair climbing for 2 weeks. No sex for 1 week.  No driving for 2 days. You may shower 24 hours following procedure but avoid baths and swimming for 1 week. Check groin site for bleeding and/or swelling daily following procedure. Call your doctor/cardiologist immediately for bleeding or swelling or if you have increased/persistent pain or drainage at the site. Follow up with your cardiologist in 1- 2 weeks. You may call [facility] at [phone] or [phone] after office hours and weekends with any questions or concerns following your procedure.

## 2018-06-19 ENCOUNTER — OUTPATIENT (OUTPATIENT)
Dept: OUTPATIENT SERVICES | Facility: HOSPITAL | Age: 73
LOS: 1 days | End: 2018-06-19
Payer: MEDICARE

## 2018-06-19 ENCOUNTER — APPOINTMENT (OUTPATIENT)
Dept: UROLOGY | Facility: CLINIC | Age: 73
End: 2018-06-19
Payer: MEDICARE

## 2018-06-19 DIAGNOSIS — Z98.61 CORONARY ANGIOPLASTY STATUS: Chronic | ICD-10-CM

## 2018-06-19 DIAGNOSIS — Z98.89 OTHER SPECIFIED POSTPROCEDURAL STATES: Chronic | ICD-10-CM

## 2018-06-19 DIAGNOSIS — N20.1 CALCULUS OF URETER: ICD-10-CM

## 2018-06-19 DIAGNOSIS — I25.10 ATHEROSCLEROTIC HEART DISEASE OF NATIVE CORONARY ARTERY WITHOUT ANGINA PECTORIS: Chronic | ICD-10-CM

## 2018-06-19 DIAGNOSIS — K08.409 PARTIAL LOSS OF TEETH, UNSPECIFIED CAUSE, UNSPECIFIED CLASS: Chronic | ICD-10-CM

## 2018-06-19 DIAGNOSIS — N20.1 CALCULUS OF URETER: Chronic | ICD-10-CM

## 2018-06-19 DIAGNOSIS — R35.0 FREQUENCY OF MICTURITION: ICD-10-CM

## 2018-06-19 DIAGNOSIS — H26.9 UNSPECIFIED CATARACT: Chronic | ICD-10-CM

## 2018-06-19 PROCEDURE — 99213 OFFICE O/P EST LOW 20 MIN: CPT | Mod: 25

## 2018-06-19 PROCEDURE — 76775 US EXAM ABDO BACK WALL LIM: CPT | Mod: 26

## 2018-06-19 PROCEDURE — 76775 US EXAM ABDO BACK WALL LIM: CPT

## 2018-06-20 DIAGNOSIS — N40.1 BENIGN PROSTATIC HYPERPLASIA WITH LOWER URINARY TRACT SYMPTOMS: ICD-10-CM

## 2018-06-20 DIAGNOSIS — N20.1 CALCULUS OF URETER: ICD-10-CM

## 2018-07-10 ENCOUNTER — OUTPATIENT (OUTPATIENT)
Dept: OUTPATIENT SERVICES | Facility: HOSPITAL | Age: 73
LOS: 1 days | End: 2018-07-10
Payer: MEDICARE

## 2018-07-10 DIAGNOSIS — N20.1 CALCULUS OF URETER: Chronic | ICD-10-CM

## 2018-07-10 DIAGNOSIS — I25.10 ATHEROSCLEROTIC HEART DISEASE OF NATIVE CORONARY ARTERY WITHOUT ANGINA PECTORIS: Chronic | ICD-10-CM

## 2018-07-10 DIAGNOSIS — Z98.61 CORONARY ANGIOPLASTY STATUS: Chronic | ICD-10-CM

## 2018-07-10 DIAGNOSIS — H26.9 UNSPECIFIED CATARACT: Chronic | ICD-10-CM

## 2018-07-10 DIAGNOSIS — Z98.89 OTHER SPECIFIED POSTPROCEDURAL STATES: Chronic | ICD-10-CM

## 2018-07-10 DIAGNOSIS — K08.409 PARTIAL LOSS OF TEETH, UNSPECIFIED CAUSE, UNSPECIFIED CLASS: Chronic | ICD-10-CM

## 2018-07-10 DIAGNOSIS — R06.02 SHORTNESS OF BREATH: ICD-10-CM

## 2018-07-10 PROCEDURE — 74176 CT ABD & PELVIS W/O CONTRAST: CPT | Mod: 26

## 2018-07-10 PROCEDURE — 71250 CT THORAX DX C-: CPT | Mod: 26

## 2018-07-10 PROCEDURE — 74176 CT ABD & PELVIS W/O CONTRAST: CPT

## 2018-07-10 PROCEDURE — 71250 CT THORAX DX C-: CPT

## 2018-07-23 ENCOUNTER — EMERGENCY (EMERGENCY)
Facility: HOSPITAL | Age: 73
LOS: 1 days | Discharge: HOME | End: 2018-07-23
Attending: EMERGENCY MEDICINE
Payer: MEDICARE

## 2018-07-23 VITALS
HEART RATE: 80 BPM | SYSTOLIC BLOOD PRESSURE: 199 MMHG | DIASTOLIC BLOOD PRESSURE: 108 MMHG | RESPIRATION RATE: 16 BRPM | WEIGHT: 164.91 LBS | OXYGEN SATURATION: 97 % | TEMPERATURE: 98 F | HEIGHT: 68 IN

## 2018-07-23 VITALS
DIASTOLIC BLOOD PRESSURE: 98 MMHG | OXYGEN SATURATION: 98 % | HEART RATE: 76 BPM | SYSTOLIC BLOOD PRESSURE: 159 MMHG | RESPIRATION RATE: 16 BRPM

## 2018-07-23 DIAGNOSIS — K08.409 PARTIAL LOSS OF TEETH, UNSPECIFIED CAUSE, UNSPECIFIED CLASS: Chronic | ICD-10-CM

## 2018-07-23 DIAGNOSIS — H26.9 UNSPECIFIED CATARACT: Chronic | ICD-10-CM

## 2018-07-23 DIAGNOSIS — N20.1 CALCULUS OF URETER: Chronic | ICD-10-CM

## 2018-07-23 DIAGNOSIS — I25.10 ATHEROSCLEROTIC HEART DISEASE OF NATIVE CORONARY ARTERY WITHOUT ANGINA PECTORIS: Chronic | ICD-10-CM

## 2018-07-23 DIAGNOSIS — Z98.89 OTHER SPECIFIED POSTPROCEDURAL STATES: Chronic | ICD-10-CM

## 2018-07-23 DIAGNOSIS — Z98.61 CORONARY ANGIOPLASTY STATUS: Chronic | ICD-10-CM

## 2018-07-23 PROBLEM — K57.90 DIVERTICULOSIS OF INTESTINE, PART UNSPECIFIED, WITHOUT PERFORATION OR ABSCESS WITHOUT BLEEDING: Chronic | Status: ACTIVE | Noted: 2017-08-01

## 2018-07-23 PROBLEM — K57.90 DIVERTICULOSIS OF INTESTINE, PART UNSPECIFIED, WITHOUT PERFORATION OR ABSCESS WITHOUT BLEEDING: Chronic | Status: ACTIVE | Noted: 2017-07-31

## 2018-07-23 PROBLEM — I21.3 ST ELEVATION (STEMI) MYOCARDIAL INFARCTION OF UNSPECIFIED SITE: Chronic | Status: ACTIVE | Noted: 2017-07-31

## 2018-07-23 LAB
ALBUMIN SERPL ELPH-MCNC: 3.9 G/DL — SIGNIFICANT CHANGE UP (ref 3.3–5)
ALP SERPL-CCNC: 91 U/L — SIGNIFICANT CHANGE UP (ref 40–120)
ALT FLD-CCNC: 32 U/L — SIGNIFICANT CHANGE UP (ref 12–78)
ANION GAP SERPL CALC-SCNC: 7 MMOL/L — SIGNIFICANT CHANGE UP (ref 5–17)
APTT BLD: 28.3 SEC — SIGNIFICANT CHANGE UP (ref 27.5–37.4)
AST SERPL-CCNC: 26 U/L — SIGNIFICANT CHANGE UP (ref 15–37)
BASOPHILS # BLD AUTO: 0.02 K/UL — SIGNIFICANT CHANGE UP (ref 0–0.2)
BASOPHILS NFR BLD AUTO: 0.3 % — SIGNIFICANT CHANGE UP (ref 0–2)
BILIRUB SERPL-MCNC: 0.7 MG/DL — SIGNIFICANT CHANGE UP (ref 0.2–1.2)
BUN SERPL-MCNC: 20 MG/DL — SIGNIFICANT CHANGE UP (ref 7–23)
CALCIUM SERPL-MCNC: 9.1 MG/DL — SIGNIFICANT CHANGE UP (ref 8.5–10.1)
CHLORIDE SERPL-SCNC: 108 MMOL/L — SIGNIFICANT CHANGE UP (ref 96–108)
CK MB CFR SERPL CALC: <1 NG/ML — SIGNIFICANT CHANGE UP (ref 0–3.6)
CK MB CFR SERPL CALC: <1 NG/ML — SIGNIFICANT CHANGE UP (ref 0–3.6)
CO2 SERPL-SCNC: 27 MMOL/L — SIGNIFICANT CHANGE UP (ref 22–31)
CREAT SERPL-MCNC: 1.2 MG/DL — SIGNIFICANT CHANGE UP (ref 0.5–1.3)
EOSINOPHIL # BLD AUTO: 0.27 K/UL — SIGNIFICANT CHANGE UP (ref 0–0.5)
EOSINOPHIL NFR BLD AUTO: 4 % — SIGNIFICANT CHANGE UP (ref 0–6)
GLUCOSE SERPL-MCNC: 116 MG/DL — HIGH (ref 70–99)
HCT VFR BLD CALC: 40.6 % — SIGNIFICANT CHANGE UP (ref 39–50)
HGB BLD-MCNC: 13.6 G/DL — SIGNIFICANT CHANGE UP (ref 13–17)
IMM GRANULOCYTES NFR BLD AUTO: 0.3 % — SIGNIFICANT CHANGE UP (ref 0–1.5)
INR BLD: 1.03 RATIO — SIGNIFICANT CHANGE UP (ref 0.88–1.16)
LYMPHOCYTES # BLD AUTO: 1.31 K/UL — SIGNIFICANT CHANGE UP (ref 1–3.3)
LYMPHOCYTES # BLD AUTO: 19.4 % — SIGNIFICANT CHANGE UP (ref 13–44)
MAGNESIUM SERPL-MCNC: 2.3 MG/DL — SIGNIFICANT CHANGE UP (ref 1.6–2.6)
MCHC RBC-ENTMCNC: 30.5 PG — SIGNIFICANT CHANGE UP (ref 27–34)
MCHC RBC-ENTMCNC: 33.5 GM/DL — SIGNIFICANT CHANGE UP (ref 32–36)
MCV RBC AUTO: 91 FL — SIGNIFICANT CHANGE UP (ref 80–100)
MONOCYTES # BLD AUTO: 0.59 K/UL — SIGNIFICANT CHANGE UP (ref 0–0.9)
MONOCYTES NFR BLD AUTO: 8.7 % — SIGNIFICANT CHANGE UP (ref 2–14)
NEUTROPHILS # BLD AUTO: 4.54 K/UL — SIGNIFICANT CHANGE UP (ref 1.8–7.4)
NEUTROPHILS NFR BLD AUTO: 67.3 % — SIGNIFICANT CHANGE UP (ref 43–77)
PLATELET # BLD AUTO: 150 K/UL — SIGNIFICANT CHANGE UP (ref 150–400)
POTASSIUM SERPL-MCNC: 4.3 MMOL/L — SIGNIFICANT CHANGE UP (ref 3.5–5.3)
POTASSIUM SERPL-SCNC: 4.3 MMOL/L — SIGNIFICANT CHANGE UP (ref 3.5–5.3)
PROT SERPL-MCNC: 7.6 G/DL — SIGNIFICANT CHANGE UP (ref 6–8.3)
PROTHROM AB SERPL-ACNC: 11.2 SEC — SIGNIFICANT CHANGE UP (ref 9.8–12.7)
RBC # BLD: 4.46 M/UL — SIGNIFICANT CHANGE UP (ref 4.2–5.8)
RBC # FLD: 13.7 % — SIGNIFICANT CHANGE UP (ref 10.3–14.5)
SODIUM SERPL-SCNC: 142 MMOL/L — SIGNIFICANT CHANGE UP (ref 135–145)
TROPONIN I SERPL-MCNC: <.015 NG/ML — SIGNIFICANT CHANGE UP (ref 0.01–0.04)
TROPONIN I SERPL-MCNC: <.015 NG/ML — SIGNIFICANT CHANGE UP (ref 0.01–0.04)
WBC # BLD: 6.75 K/UL — SIGNIFICANT CHANGE UP (ref 3.8–10.5)
WBC # FLD AUTO: 6.75 K/UL — SIGNIFICANT CHANGE UP (ref 3.8–10.5)

## 2018-07-23 PROCEDURE — 82553 CREATINE MB FRACTION: CPT

## 2018-07-23 PROCEDURE — 80053 COMPREHEN METABOLIC PANEL: CPT

## 2018-07-23 PROCEDURE — 71045 X-RAY EXAM CHEST 1 VIEW: CPT

## 2018-07-23 PROCEDURE — 70450 CT HEAD/BRAIN W/O DYE: CPT | Mod: 26

## 2018-07-23 PROCEDURE — 85610 PROTHROMBIN TIME: CPT

## 2018-07-23 PROCEDURE — 71045 X-RAY EXAM CHEST 1 VIEW: CPT | Mod: 26

## 2018-07-23 PROCEDURE — 99285 EMERGENCY DEPT VISIT HI MDM: CPT

## 2018-07-23 PROCEDURE — 96374 THER/PROPH/DIAG INJ IV PUSH: CPT

## 2018-07-23 PROCEDURE — 96375 TX/PRO/DX INJ NEW DRUG ADDON: CPT

## 2018-07-23 PROCEDURE — 93010 ELECTROCARDIOGRAM REPORT: CPT

## 2018-07-23 PROCEDURE — 93005 ELECTROCARDIOGRAM TRACING: CPT

## 2018-07-23 PROCEDURE — 36415 COLL VENOUS BLD VENIPUNCTURE: CPT

## 2018-07-23 PROCEDURE — 85730 THROMBOPLASTIN TIME PARTIAL: CPT

## 2018-07-23 PROCEDURE — 70450 CT HEAD/BRAIN W/O DYE: CPT

## 2018-07-23 PROCEDURE — 84484 ASSAY OF TROPONIN QUANT: CPT

## 2018-07-23 PROCEDURE — 83735 ASSAY OF MAGNESIUM: CPT

## 2018-07-23 PROCEDURE — 99284 EMERGENCY DEPT VISIT MOD MDM: CPT | Mod: 25

## 2018-07-23 PROCEDURE — 85027 COMPLETE CBC AUTOMATED: CPT

## 2018-07-23 RX ORDER — SODIUM CHLORIDE 9 MG/ML
1000 INJECTION INTRAMUSCULAR; INTRAVENOUS; SUBCUTANEOUS
Qty: 0 | Refills: 0 | Status: DISCONTINUED | OUTPATIENT
Start: 2018-07-23 | End: 2018-07-27

## 2018-07-23 RX ORDER — LOSARTAN POTASSIUM 100 MG/1
25 TABLET, FILM COATED ORAL ONCE
Qty: 0 | Refills: 0 | Status: COMPLETED | OUTPATIENT
Start: 2018-07-23 | End: 2018-07-23

## 2018-07-23 RX ORDER — HYDRALAZINE HCL 50 MG
10 TABLET ORAL ONCE
Qty: 0 | Refills: 0 | Status: COMPLETED | OUTPATIENT
Start: 2018-07-23 | End: 2018-07-23

## 2018-07-23 RX ORDER — LABETALOL HCL 100 MG
200 TABLET ORAL ONCE
Qty: 0 | Refills: 0 | Status: COMPLETED | OUTPATIENT
Start: 2018-07-23 | End: 2018-07-23

## 2018-07-23 RX ADMIN — Medication 200 MILLIGRAM(S): at 09:55

## 2018-07-23 RX ADMIN — Medication 1 MILLIGRAM(S): at 04:52

## 2018-07-23 RX ADMIN — Medication 10 MILLIGRAM(S): at 03:59

## 2018-07-23 RX ADMIN — LOSARTAN POTASSIUM 25 MILLIGRAM(S): 100 TABLET, FILM COATED ORAL at 09:55

## 2018-07-23 RX ADMIN — SODIUM CHLORIDE 125 MILLILITER(S): 9 INJECTION INTRAMUSCULAR; INTRAVENOUS; SUBCUTANEOUS at 06:12

## 2018-07-23 NOTE — CONSULT NOTE ADULT - SUBJECTIVE AND OBJECTIVE BOX
[facility]         Vanesa Dumas, Severiano, Kae, Richard, Lucille Soto        [phone] (office)    CHIEF COMPLAINT: Patient is a 73y old  Male who presents with a chief complaint of HTN    HPI: 72yo M h/o cad stents, htn p/w elevated bp associated w/ generalized weakness, fatigue and orthostatic lightheadedness x 1 day. Patient states that his BP has been elevated since last night and kept going up, he admits to headache, pins and needles feeling in arms and legs, and facial flushing. He states that he has had these symptoms before when his BP is elevated.  denies f/c/n/v/d/cough/sob/cp/visual changes.    PAST MEDICAL & SURGICAL HISTORY:  Diverticulosis  MI (myocardial infarction)  Diverticulosis  BPH (benign prostatic hyperplasia)  Enlarged aorta  Gout  GERD (gastroesophageal reflux disease)  S/P coronary artery stent placement: 2001 LCx and RCA, 6/2014 &amp; 1/2015  MI, old: 2001  CAD (coronary artery disease)  Hypercholesteremia  HTN (hypertension)  S/P coronary angioplasty: 8 stents  CAD (coronary artery disease): 8 stents  Ureteral stone: Insertion of right  ureteral stent - 9/2015  S/P angioplasty with stent: x 3 ( 12/2001, 6/2014 &amp; 01/2015)  H/O tooth extraction  Cataract: s/p extraction bilat 2/2014      SOCIAL HISTORY: No active tobacco, alcohol or illicit drug use    FAMILY HISTORY:  Family history of CABG: mother with CABG at 81yo  Family history of coronary artery disease (Mother)   No pertinent family history of CAD    Outpatient medications:  	labetalol 200 mg oral tablet: Last Dose Taken:  , 1 tab(s) orally 3 times a day home /hosp  · 	clopidogrel 75 mg oral tablet: Last Dose Taken:  , 1 tab(s) orally once a day hosp  · 	aspirin 81 mg oral delayed release tablet: Last Dose Taken:  , 1 tab(s) orally once a day home /hosp  · 	losartan 25 mg oral tablet: Last Dose Taken:  , 1 tab(s) orally once a day home & hosp  · 	pravastatin 40 mg oral tablet: Last Dose Taken:  , 1 tab(s) orally once a day (at bedtime) home  · 	allopurinol 100 mg oral tablet: Last Dose Taken:  , 1 tab(s) orally 3 times a day home/hosp    MEDICATIONS  (STANDING):  sodium chloride 0.9%. 1000 milliLiter(s) (125 mL/Hr) IV Continuous <Continuous>    MEDICATIONS  (PRN):      Allergies    No Known Drug Allergies  strawberry (Hives)    Intolerances        REVIEW OF SYSTEMS: Is negative for eye, ENT, GI, , allergic, dermatologic, musculoskeletal and neurologic, except as described above.    VITAL SIGNS:   Vital Signs Last 24 Hrs  T(C): 36.8 (23 Jul 2018 04:45), Max: 36.8 (23 Jul 2018 04:45)  T(F): 98.2 (23 Jul 2018 04:45), Max: 98.2 (23 Jul 2018 04:45)  HR: 75 (23 Jul 2018 04:45) (68 - 80)  BP: 165/88 (23 Jul 2018 04:53) (165/88 - 199/108)  BP(mean): --  RR: 16 (23 Jul 2018 04:45) (15 - 16)  SpO2: 98% (23 Jul 2018 04:45) (97% - 98%)    I&O's Summary      PHYSICAL EXAM:    Constitutional: NAD, awake and alert, well-developed  Eyes:  EOMI, no oral cyanosis, conjunctivae clear, anicteric.  Pulmonary: Non-labored, breath sounds are clear bilaterally, no wheezing, rales or rhonchi  Cardiovascular:  regular S1 and S2. No murmur.  No rubs, gallops or clicks  Gastrointestinal: Bowel Sounds present, soft, nontender.   Lymph: No peripheral edema.   Neurological: Alert, strength and sensitivity are grossly intact  Skin: No obvious lesions/rashes.   Psych:  Mood & affect appropriate .    LABS: All Labs Reviewed:                        13.6   6.75  )-----------( 150      ( 23 Jul 2018 02:12 )             40.6     23 Jul 2018 02:12    142    |  108    |  20     ----------------------------<  116    4.3     |  27     |  1.20     Ca    9.1        23 Jul 2018 02:12  Mg     2.3       23 Jul 2018 02:12    TPro  7.6    /  Alb  3.9    /  TBili  0.7    /  DBili  x      /  AST  26     /  ALT  32     /  AlkPhos  91     23 Jul 2018 02:12    PT/INR - ( 23 Jul 2018 02:12 )   PT: 11.2 sec;   INR: 1.03 ratio         PTT - ( 23 Jul 2018 02:12 )  PTT:28.3 sec  CARDIAC MARKERS ( 23 Jul 2018 02:12 )  <.015 ng/mL / x     / x     / x     / <1.0 ng/mL      Blood Culture:         RADIOLOGY:    EKG: NSR 65 [facility]         Vanesa Dumas, Severiano, Kae, Richard, Lucille Soto        [phone] (office)    CHIEF COMPLAINT: Patient is a 73y old  Male who presents with a chief complaint of HTN    HPI: 72yo M h/o cad stents, htn p/w elevated bp associated w/ generalized weakness, fatigue and orthostatic lightheadedness x 1 day. Patient states that his BP has been elevated since last night and kept going up, he admits to headache, pins and needles feeling in arms and legs, and facial flushing. He states that he has had these symptoms before when his BP is elevated.  denies f/c/n/v/d/cough/sob/cp/visual changes.    PAST MEDICAL & SURGICAL HISTORY:  Diverticulosis  MI (myocardial infarction)  Diverticulosis  BPH (benign prostatic hyperplasia)  Enlarged aorta  Gout  GERD (gastroesophageal reflux disease)  S/P coronary artery stent placement: 2001 LCx and RCA, 6/2014 &amp; 1/2015  MI, old: 2001  CAD (coronary artery disease)  Hypercholesteremia  HTN (hypertension)  S/P coronary angioplasty: 8 stents  CAD (coronary artery disease): 8 stents  Ureteral stone: Insertion of right  ureteral stent - 9/2015  S/P angioplasty with stent: x 3 ( 12/2001, 6/2014 &amp; 01/2015)  H/O tooth extraction  Cataract: s/p extraction bilat 2/2014      SOCIAL HISTORY: No active tobacco, alcohol or illicit drug use    FAMILY HISTORY:  Family history of CABG: mother with CABG at 79yo  Family history of coronary artery disease (Mother)   No pertinent family history of CAD    Outpatient medications:  	labetalol 200 mg oral tablet: Last Dose Taken:  , 1 tab(s) orally 3 times a day home /hosp  · 	clopidogrel 75 mg oral tablet: Last Dose Taken:  , 1 tab(s) orally once a day hosp  · 	aspirin 81 mg oral delayed release tablet: Last Dose Taken:  , 1 tab(s) orally once a day home /hosp  · 	losartan 25 mg oral tablet: Last Dose Taken:  , 1 tab(s) orally once a day home & hosp  · 	pravastatin 40 mg oral tablet: Last Dose Taken:  , 1 tab(s) orally once a day (at bedtime) home  · 	allopurinol 100 mg oral tablet: Last Dose Taken:  , 1 tab(s) orally 3 times a day home/hosp    MEDICATIONS  (STANDING):  sodium chloride 0.9%. 1000 milliLiter(s) (125 mL/Hr) IV Continuous <Continuous>    MEDICATIONS  (PRN):      Allergies    No Known Drug Allergies  strawberry (Hives)    Intolerances        REVIEW OF SYSTEMS: Is negative for eye, ENT, GI, , allergic, dermatologic, musculoskeletal and neurologic, except as described above.    VITAL SIGNS:   Vital Signs Last 24 Hrs  T(C): 36.8 (23 Jul 2018 04:45), Max: 36.8 (23 Jul 2018 04:45)  T(F): 98.2 (23 Jul 2018 04:45), Max: 98.2 (23 Jul 2018 04:45)  HR: 75 (23 Jul 2018 04:45) (68 - 80)  BP: 165/88 (23 Jul 2018 04:53) (165/88 - 199/108)  BP(mean): --  RR: 16 (23 Jul 2018 04:45) (15 - 16)  SpO2: 98% (23 Jul 2018 04:45) (97% - 98%)    I&O's Summary      PHYSICAL EXAM:    Constitutional: NAD, awake and alert, well-developed  Eyes:  EOMI, no oral cyanosis, conjunctivae clear, anicteric.  Pulmonary: Non-labored, breath sounds are clear bilaterally, no wheezing, rales or rhonchi  Cardiovascular:  regular S1 and S2. No murmur.  No rubs, gallops or clicks  Gastrointestinal: Bowel Sounds present, soft, nontender.   Lymph: No peripheral edema.   Neurological: Alert, strength and sensitivity are grossly intact  Skin: No obvious lesions/rashes.   Psych:  Mood & affect appropriate .    LABS: All Labs Reviewed:                        13.6   6.75  )-----------( 150      ( 23 Jul 2018 02:12 )             40.6     23 Jul 2018 02:12    142    |  108    |  20     ----------------------------<  116    4.3     |  27     |  1.20     Ca    9.1        23 Jul 2018 02:12  Mg     2.3       23 Jul 2018 02:12    TPro  7.6    /  Alb  3.9    /  TBili  0.7    /  DBili  x      /  AST  26     /  ALT  32     /  AlkPhos  91     23 Jul 2018 02:12    PT/INR - ( 23 Jul 2018 02:12 )   PT: 11.2 sec;   INR: 1.03 ratio         PTT - ( 23 Jul 2018 02:12 )  PTT:28.3 sec  CARDIAC MARKERS ( 23 Jul 2018 02:12 )  <.015 ng/mL / x     / x     / x     / <1.0 ng/mL      Blood Culture:         RADIOLOGY:    EKG: NSR 65, [facility]         Vanesa Dumas, Kae العلي, Richard, Lucille Soto        [phone] (office)    CHIEF COMPLAINT: Patient is a 73y old  Male who presents with a chief complaint of HTN urgency, dizziness    HPI: 74yo M h/o cad stents, htn p/w elevated bp associated w/ generalized weakness, fatigue and orthostatic lightheadedness x 1 day. Patient states that his BP has been elevated since last night and kept going up, he admits to headache, pins and needles feeling in arms and legs, and facial flushing. He states that he has had these symptoms before when his BP is elevated.  denies f/c/n/v/d/cough/sob/cp/visual changes.    PAST MEDICAL & SURGICAL HISTORY:  Diverticulosis  MI (myocardial infarction)  Diverticulosis  BPH (benign prostatic hyperplasia)  Enlarged aorta  Gout  GERD (gastroesophageal reflux disease)  S/P coronary artery stent placement: 2001 LCx and RCA, 6/2014 &amp; 1/2015  MI, old: 2001  CAD (coronary artery disease)  Hypercholesteremia  HTN (hypertension)  S/P coronary angioplasty: 8 stents  CAD (coronary artery disease): 8 stents  Ureteral stone: Insertion of right  ureteral stent - 9/2015  S/P angioplasty with stent: x 3 ( 12/2001, 6/2014 &amp; 01/2015)  H/O tooth extraction  Cataract: s/p extraction bilat 2/2014      SOCIAL HISTORY: No active tobacco, alcohol or illicit drug use    FAMILY HISTORY:  Family history of CABG: mother with CABG at 79yo  Family history of coronary artery disease (Mother)   No pertinent family history of CAD    Outpatient medications:  	labetalol 200 mg oral tablet: Last Dose Taken:  , 1 tab(s) orally 3 times a day home /hosp  · 	clopidogrel 75 mg oral tablet: Last Dose Taken:  , 1 tab(s) orally once a day hosp  · 	aspirin 81 mg oral delayed release tablet: Last Dose Taken:  , 1 tab(s) orally once a day home /hosp  · 	losartan 25 mg oral tablet: Last Dose Taken:  , 1 tab(s) orally once a day home & hosp  · 	pravastatin 40 mg oral tablet: Last Dose Taken:  , 1 tab(s) orally once a day (at bedtime) home  · 	allopurinol 100 mg oral tablet: Last Dose Taken:  , 1 tab(s) orally 3 times a day home/hosp    MEDICATIONS  (STANDING):  sodium chloride 0.9%. 1000 milliLiter(s) (125 mL/Hr) IV Continuous <Continuous>    MEDICATIONS  (PRN):      Allergies    No Known Drug Allergies  strawberry (Hives)    Intolerances        REVIEW OF SYSTEMS: Is negative for eye, ENT, GI, , allergic, dermatologic, musculoskeletal and neurologic, except as described above.    VITAL SIGNS:   Vital Signs Last 24 Hrs  T(C): 36.8 (23 Jul 2018 04:45), Max: 36.8 (23 Jul 2018 04:45)  T(F): 98.2 (23 Jul 2018 04:45), Max: 98.2 (23 Jul 2018 04:45)  HR: 75 (23 Jul 2018 04:45) (68 - 80)  BP: 165/88 (23 Jul 2018 04:53) (165/88 - 199/108)  BP(mean): --  RR: 16 (23 Jul 2018 04:45) (15 - 16)  SpO2: 98% (23 Jul 2018 04:45) (97% - 98%)    I&O's Summary      PHYSICAL EXAM:    Constitutional: NAD, awake and alert, well-developed  Eyes:  EOMI, no oral cyanosis, conjunctivae clear, anicteric.  Pulmonary: Non-labored, breath sounds are clear bilaterally, no wheezing, rales or rhonchi  Cardiovascular:  regular S1 and S2. No murmur.  No rubs, gallops or clicks  Gastrointestinal: Bowel Sounds present, soft, nontender.   Lymph: No peripheral edema.   Neurological: Alert, strength and sensitivity are grossly intact  Skin: No obvious lesions/rashes.   Psych:  Mood & affect appropriate .    LABS: All Labs Reviewed:                        13.6   6.75  )-----------( 150      ( 23 Jul 2018 02:12 )             40.6     23 Jul 2018 02:12    142    |  108    |  20     ----------------------------<  116    4.3     |  27     |  1.20     Ca    9.1        23 Jul 2018 02:12  Mg     2.3       23 Jul 2018 02:12    TPro  7.6    /  Alb  3.9    /  TBili  0.7    /  DBili  x      /  AST  26     /  ALT  32     /  AlkPhos  91     23 Jul 2018 02:12    PT/INR - ( 23 Jul 2018 02:12 )   PT: 11.2 sec;   INR: 1.03 ratio         PTT - ( 23 Jul 2018 02:12 )  PTT:28.3 sec  CARDIAC MARKERS ( 23 Jul 2018 02:12 )  <.015 ng/mL / x     / x     / x     / <1.0 ng/mL      Blood Culture:         RADIOLOGY:    EKG: NSR 65, [facility]

## 2018-07-23 NOTE — ED PROVIDER NOTE - PROGRESS NOTE DETAILS
ekg ns, nonischemic ekg ns, nonischemic, labs cxr unremarkable, ct shows lacunar infarct, age inderminate. given hydralazine, ativan w/ improvement, pt remains neurologically intact, will repeat ce, pt reports recently starting on oxybutynin 5 days prior, suspect likely culprit for elevated BP however given ct findings will consult neuro in AM. Pt seen bY Dr SIMEON Soto. giving meds for BP control, will monitor. Dw DR Cabrera, will see pt. Pt seen and cleared by Dr Cabrera, outpt fu Pt seen and cleared by Dr Cabrera Pt doing well, BP improved. Dw Dr SIMEON Soto, inc Losartan to 25mg twice daily m, outpt fu  Dw pt re HTN prec / inst, importance of close, prompt cardiology fu and to return with any changes or concerns.

## 2018-07-23 NOTE — ED PROVIDER NOTE - MEDICAL DECISION MAKING DETAILS
74yo M h/o cad stents, htn p/w elevated bp associated w/ generalized weakness, fatigue and orthostatic lightheadedness x 1 day. denies f/c/n/v/d/cough/sob/cp/ha/visual changes.

## 2018-07-23 NOTE — ED PROVIDER NOTE - PHYSICAL EXAMINATION
GEN: awake, alert, well appearing, NAD   HENT: atraumatic, normocephalic, LEILANI, EOMI, no midline instability, oropharynx w/o erythema or exudates, no lymphadenopathy  CV: normal rate and rhythm, S1, S2, no MRG, equal pulses throughout, no JVD, Hypertensive   RESP: no distress, no IWOB, no retraction, clear to auscultation bilaterally   ABD: soft, nontender, nondistended, no rebound, no guarding, normoactive bowel sounds, no organomegally  MUSCULOSKELETAL: strenght 5/5 x 4, full range of motion, CMS intact   SKIN: normal color, no turgor, no wounds or rash   NEURO: Awake alert oriented x 3, no facial asymmetry, no slurred speech, no pronator drift, moving all extremities  PSYCH: no suicial ideation, no homicidal ideation, no depression, no anxiety, no hallucination

## 2018-07-23 NOTE — ED ADULT NURSE NOTE - OBJECTIVE STATEMENT
pt took blood pressure at home was elevated at 180/100 pt also c/o of feeling weak and lightheaded at times -

## 2018-07-23 NOTE — ED ADULT TRIAGE NOTE - CHIEF COMPLAINT QUOTE
patient reports elevated blood pressure tonight 186/100 at home. history of hypertension, compliant with medication (labetalol 200mg 3x a day and 25 mg losartan 1x a day). complains of dizziness. denies chest pain/shortness of breath, denies headache/blurred vision

## 2018-07-23 NOTE — ED PROVIDER NOTE - NS ED ROS FT
GEN: no fever, no chills, no weakness  HENT: no eye pain, no visual changes, no ear pain, no visual or hearing changes, no sore throat, no swelling or neck pain  CV: no chest pain, no palpitations, no dizziness, no swelling, +elevated BP   RESP: no coughing, no sob, no IWOB, no PALM  GI: no abd pain, no distension, no nausea, no vomiting, no diarrhea, no constipation  : no dysuria,  no frequency, no hematuria, no discharge, no flank pain  MUSCULOSKELETAL: no myalgia, no arthralgia, no joint swelling, no bruising   SKIN: no rash, no wounds, no itching  NEURO: no change in mentation, no visual changes, no HA, no focal weakness, no trouble speaking, no gait abnormalities, no dizziness  PSYCH: no suidical ideation, no homicidal ideation, no depression, no anxiety, no hallucinations

## 2018-07-23 NOTE — CONSULT NOTE ADULT - ASSESSMENT
72yo M h/o cad stents, htn p/w elevated bp associated w/ generalized weakness, fatigue and orthostatic lightheadedness x 1 day. We have been consulted for HTN.      EKG shows no ischemic changes, troponin negative x1      No known history of anemia    No signs of volume overload    HTN 74yo M h/o cad stents, htn p/w elevated bp associated w/ generalized weakness, fatigue and orthostatic lightheadedness x 1 day. We have been consulted for HTN.      EKG shows no ischemic changes, troponin negative x1      No known history of arrythmia     No signs of volume overload     HTN: will give patient home dose of losartan and labetalol and recheck BP in 30 mins. If still elevated may need to add another dose of losartan.      patient instructed to take losartan as prescribed      d/c oxybutynin 74yo M h/o cad stents, htn p/w elevated bp associated w/ generalized weakness, fatigue and orthostatic lightheadedness x 1 day. We have been consulted for HTN.      -   - EKG shows no ischemic changes, troponin negative x1   - No signs of significant ischemia. Doubt 2/2 ACS. Recent Cath with patent stents.   - No known history of arrythmia  -  No signs of volume overload  - HTN: Symptoms likely from uncontrolled HTN.  will give patient home dose of losartan and labetalol and recheck BP in 30 mins. If still elevated may need to add another dose of losartan. He has been missing doses of Labetelol. Patient instructed to take as prescribed.   - d/c oxybutynin  - Further cardiac workup will depend on clinical course.  If BP improves can f/u with Dr. Long.

## 2018-08-16 ENCOUNTER — EMERGENCY (EMERGENCY)
Facility: HOSPITAL | Age: 73
LOS: 1 days | Discharge: HOME | End: 2018-08-16
Attending: INTERNAL MEDICINE
Payer: MEDICARE

## 2018-08-16 VITALS
OXYGEN SATURATION: 98 % | RESPIRATION RATE: 15 BRPM | WEIGHT: 164.91 LBS | DIASTOLIC BLOOD PRESSURE: 107 MMHG | SYSTOLIC BLOOD PRESSURE: 195 MMHG | HEIGHT: 68 IN | TEMPERATURE: 98 F | HEART RATE: 81 BPM

## 2018-08-16 VITALS — DIASTOLIC BLOOD PRESSURE: 87 MMHG | SYSTOLIC BLOOD PRESSURE: 159 MMHG

## 2018-08-16 DIAGNOSIS — Z98.61 CORONARY ANGIOPLASTY STATUS: Chronic | ICD-10-CM

## 2018-08-16 DIAGNOSIS — K08.409 PARTIAL LOSS OF TEETH, UNSPECIFIED CAUSE, UNSPECIFIED CLASS: Chronic | ICD-10-CM

## 2018-08-16 DIAGNOSIS — I25.10 ATHEROSCLEROTIC HEART DISEASE OF NATIVE CORONARY ARTERY WITHOUT ANGINA PECTORIS: Chronic | ICD-10-CM

## 2018-08-16 DIAGNOSIS — H26.9 UNSPECIFIED CATARACT: Chronic | ICD-10-CM

## 2018-08-16 DIAGNOSIS — N20.1 CALCULUS OF URETER: Chronic | ICD-10-CM

## 2018-08-16 DIAGNOSIS — Z98.89 OTHER SPECIFIED POSTPROCEDURAL STATES: Chronic | ICD-10-CM

## 2018-08-16 LAB
ALBUMIN SERPL ELPH-MCNC: 3.8 G/DL — SIGNIFICANT CHANGE UP (ref 3.3–5)
ALP SERPL-CCNC: 100 U/L — SIGNIFICANT CHANGE UP (ref 40–120)
ALT FLD-CCNC: 29 U/L — SIGNIFICANT CHANGE UP (ref 12–78)
ANION GAP SERPL CALC-SCNC: 6 MMOL/L — SIGNIFICANT CHANGE UP (ref 5–17)
AST SERPL-CCNC: 27 U/L — SIGNIFICANT CHANGE UP (ref 15–37)
BILIRUB SERPL-MCNC: 0.7 MG/DL — SIGNIFICANT CHANGE UP (ref 0.2–1.2)
BUN SERPL-MCNC: 22 MG/DL — SIGNIFICANT CHANGE UP (ref 7–23)
CALCIUM SERPL-MCNC: 9.1 MG/DL — SIGNIFICANT CHANGE UP (ref 8.5–10.1)
CHLORIDE SERPL-SCNC: 108 MMOL/L — SIGNIFICANT CHANGE UP (ref 96–108)
CO2 SERPL-SCNC: 26 MMOL/L — SIGNIFICANT CHANGE UP (ref 22–31)
CREAT SERPL-MCNC: 1.2 MG/DL — SIGNIFICANT CHANGE UP (ref 0.5–1.3)
GLUCOSE SERPL-MCNC: 135 MG/DL — HIGH (ref 70–99)
HCT VFR BLD CALC: 39.9 % — SIGNIFICANT CHANGE UP (ref 39–50)
HGB BLD-MCNC: 13.4 G/DL — SIGNIFICANT CHANGE UP (ref 13–17)
MCHC RBC-ENTMCNC: 30.7 PG — SIGNIFICANT CHANGE UP (ref 27–34)
MCHC RBC-ENTMCNC: 33.6 GM/DL — SIGNIFICANT CHANGE UP (ref 32–36)
MCV RBC AUTO: 91.3 FL — SIGNIFICANT CHANGE UP (ref 80–100)
NRBC # BLD: 0 /100 WBCS — SIGNIFICANT CHANGE UP (ref 0–0)
PLATELET # BLD AUTO: 145 K/UL — LOW (ref 150–400)
POTASSIUM SERPL-MCNC: 4.6 MMOL/L — SIGNIFICANT CHANGE UP (ref 3.5–5.3)
POTASSIUM SERPL-SCNC: 4.6 MMOL/L — SIGNIFICANT CHANGE UP (ref 3.5–5.3)
PROT SERPL-MCNC: 7.7 G/DL — SIGNIFICANT CHANGE UP (ref 6–8.3)
RBC # BLD: 4.37 M/UL — SIGNIFICANT CHANGE UP (ref 4.2–5.8)
RBC # FLD: 13.6 % — SIGNIFICANT CHANGE UP (ref 10.3–14.5)
SODIUM SERPL-SCNC: 140 MMOL/L — SIGNIFICANT CHANGE UP (ref 135–145)
WBC # BLD: 6.73 K/UL — SIGNIFICANT CHANGE UP (ref 3.8–10.5)
WBC # FLD AUTO: 6.73 K/UL — SIGNIFICANT CHANGE UP (ref 3.8–10.5)

## 2018-08-16 PROCEDURE — 93005 ELECTROCARDIOGRAM TRACING: CPT

## 2018-08-16 PROCEDURE — 71045 X-RAY EXAM CHEST 1 VIEW: CPT | Mod: 26

## 2018-08-16 PROCEDURE — 93880 EXTRACRANIAL BILAT STUDY: CPT | Mod: 26

## 2018-08-16 PROCEDURE — 71045 X-RAY EXAM CHEST 1 VIEW: CPT

## 2018-08-16 PROCEDURE — 99284 EMERGENCY DEPT VISIT MOD MDM: CPT

## 2018-08-16 PROCEDURE — 93880 EXTRACRANIAL BILAT STUDY: CPT

## 2018-08-16 PROCEDURE — 85027 COMPLETE CBC AUTOMATED: CPT

## 2018-08-16 PROCEDURE — 99284 EMERGENCY DEPT VISIT MOD MDM: CPT | Mod: 25

## 2018-08-16 PROCEDURE — 80053 COMPREHEN METABOLIC PANEL: CPT

## 2018-08-16 PROCEDURE — 36415 COLL VENOUS BLD VENIPUNCTURE: CPT

## 2018-08-16 RX ORDER — ALLOPURINOL 300 MG
1 TABLET ORAL
Qty: 0 | Refills: 0 | COMMUNITY

## 2018-08-16 RX ORDER — HYDRALAZINE HCL 50 MG
25 TABLET ORAL ONCE
Qty: 0 | Refills: 0 | Status: COMPLETED | OUTPATIENT
Start: 2018-08-16 | End: 2018-08-16

## 2018-08-16 RX ADMIN — Medication 1 MILLIGRAM(S): at 02:36

## 2018-08-16 RX ADMIN — Medication 25 MILLIGRAM(S): at 02:38

## 2018-08-16 NOTE — ED ADULT NURSE NOTE - NSIMPLEMENTINTERV_GEN_ALL_ED
Implemented All Universal Safety Interventions:  [place] to call system. Call bell, personal items and telephone within reach. Instruct patient to call for assistance. Room bathroom lighting operational. Non-slip footwear when patient is off stretcher. Physically safe environment: no spills, clutter or unnecessary equipment. Stretcher in lowest position, wheels locked, appropriate side rails in place.

## 2018-08-16 NOTE — ED POST DISCHARGE NOTE - DETAILS
Dw pt re US findings, will be coming to hospital tomorrow to  reports, will notify MD about stenosis for fu

## 2018-08-16 NOTE — ED ADULT TRIAGE NOTE - CHIEF COMPLAINT QUOTE
"my blood pressure wont go down" patient reports systolic pressure in the 190s throughout the night. patient took daily blood pressure medication around 2130

## 2018-08-16 NOTE — ED ADULT NURSE NOTE - OBJECTIVE STATEMENT
Pt presents awake alert and oriented x 3, BARRIOS airway patent, C/O high blood pressure with lightheadedness, denies nausea, HA. Pt reports that he took his losartan and his labetalol around 21:30 and no improvement in the blood pressure. safety maintained, will continue to monitor.

## 2018-08-16 NOTE — ED PROVIDER NOTE - OBJECTIVE STATEMENT
74 y/o WM h/o HTN  Hypercholesteremia  MI C/O lightheaded , mild headache. He had elevated BP at home and took his PM dose of Losartan and labetalol. no CP, no SOB, no acute stroke symptoms. 72 y/o WM h/o HTN  Hypercholesteremia  MI C/O lightheaded , mild headache. He had elevated BP at home and took his PM dose of Losartan and labetalol. no CP, no SOB, no acute stroke symptoms. Dr Kraig Long angiography 3/2018 Aortic dilation -stable. 72 y/o WM h/o HTN  Hypercholesteremia  MI C/O lightheaded , mild headache. He had elevated BP at home and took his PM dose of Losartan and labetalol. no CP, no SOB, no acute stroke symptoms. Dr Kraig Long angiography 3/2018 Aortic dilation -stable. H/O CAD 9 stents

## 2018-08-17 ENCOUNTER — EMERGENCY (EMERGENCY)
Facility: HOSPITAL | Age: 73
LOS: 1 days | Discharge: HOME | End: 2018-08-17
Attending: EMERGENCY MEDICINE
Payer: MEDICARE

## 2018-08-17 VITALS
WEIGHT: 164.91 LBS | HEART RATE: 85 BPM | HEIGHT: 68 IN | SYSTOLIC BLOOD PRESSURE: 178 MMHG | OXYGEN SATURATION: 97 % | RESPIRATION RATE: 17 BRPM | DIASTOLIC BLOOD PRESSURE: 115 MMHG | TEMPERATURE: 98 F

## 2018-08-17 VITALS
HEART RATE: 69 BPM | OXYGEN SATURATION: 95 % | DIASTOLIC BLOOD PRESSURE: 96 MMHG | RESPIRATION RATE: 18 BRPM | SYSTOLIC BLOOD PRESSURE: 183 MMHG

## 2018-08-17 DIAGNOSIS — K08.409 PARTIAL LOSS OF TEETH, UNSPECIFIED CAUSE, UNSPECIFIED CLASS: Chronic | ICD-10-CM

## 2018-08-17 DIAGNOSIS — I25.10 ATHEROSCLEROTIC HEART DISEASE OF NATIVE CORONARY ARTERY WITHOUT ANGINA PECTORIS: Chronic | ICD-10-CM

## 2018-08-17 DIAGNOSIS — Z98.61 CORONARY ANGIOPLASTY STATUS: Chronic | ICD-10-CM

## 2018-08-17 DIAGNOSIS — H26.9 UNSPECIFIED CATARACT: Chronic | ICD-10-CM

## 2018-08-17 DIAGNOSIS — N20.1 CALCULUS OF URETER: Chronic | ICD-10-CM

## 2018-08-17 DIAGNOSIS — Z98.89 OTHER SPECIFIED POSTPROCEDURAL STATES: Chronic | ICD-10-CM

## 2018-08-17 LAB
ALBUMIN SERPL ELPH-MCNC: 4.6 G/DL — SIGNIFICANT CHANGE UP (ref 3.3–5)
ALP SERPL-CCNC: 93 U/L — SIGNIFICANT CHANGE UP (ref 40–120)
ALT FLD-CCNC: 18 U/L — SIGNIFICANT CHANGE UP (ref 10–45)
ANION GAP SERPL CALC-SCNC: 14 MMOL/L — SIGNIFICANT CHANGE UP (ref 5–17)
APTT BLD: 27.6 SEC — SIGNIFICANT CHANGE UP (ref 27.5–37.4)
AST SERPL-CCNC: 20 U/L — SIGNIFICANT CHANGE UP (ref 10–40)
BASOPHILS # BLD AUTO: 0 K/UL — SIGNIFICANT CHANGE UP (ref 0–0.2)
BASOPHILS NFR BLD AUTO: 0.3 % — SIGNIFICANT CHANGE UP (ref 0–2)
BILIRUB SERPL-MCNC: 1.2 MG/DL — SIGNIFICANT CHANGE UP (ref 0.2–1.2)
BUN SERPL-MCNC: 22 MG/DL — SIGNIFICANT CHANGE UP (ref 7–23)
CALCIUM SERPL-MCNC: 10.1 MG/DL — SIGNIFICANT CHANGE UP (ref 8.4–10.5)
CHLORIDE SERPL-SCNC: 101 MMOL/L — SIGNIFICANT CHANGE UP (ref 96–108)
CO2 SERPL-SCNC: 24 MMOL/L — SIGNIFICANT CHANGE UP (ref 22–31)
CREAT SERPL-MCNC: 1.27 MG/DL — SIGNIFICANT CHANGE UP (ref 0.5–1.3)
EOSINOPHIL # BLD AUTO: 0.2 K/UL — SIGNIFICANT CHANGE UP (ref 0–0.5)
EOSINOPHIL NFR BLD AUTO: 3 % — SIGNIFICANT CHANGE UP (ref 0–6)
GLUCOSE SERPL-MCNC: 119 MG/DL — HIGH (ref 70–99)
HCT VFR BLD CALC: 39.3 % — SIGNIFICANT CHANGE UP (ref 39–50)
HGB BLD-MCNC: 13.4 G/DL — SIGNIFICANT CHANGE UP (ref 13–17)
INR BLD: 1.09 RATIO — SIGNIFICANT CHANGE UP (ref 0.88–1.16)
LYMPHOCYTES # BLD AUTO: 1 K/UL — SIGNIFICANT CHANGE UP (ref 1–3.3)
LYMPHOCYTES # BLD AUTO: 16.2 % — SIGNIFICANT CHANGE UP (ref 13–44)
MCHC RBC-ENTMCNC: 31.2 PG — SIGNIFICANT CHANGE UP (ref 27–34)
MCHC RBC-ENTMCNC: 34 GM/DL — SIGNIFICANT CHANGE UP (ref 32–36)
MCV RBC AUTO: 91.8 FL — SIGNIFICANT CHANGE UP (ref 80–100)
MONOCYTES # BLD AUTO: 0.5 K/UL — SIGNIFICANT CHANGE UP (ref 0–0.9)
MONOCYTES NFR BLD AUTO: 8.1 % — SIGNIFICANT CHANGE UP (ref 2–14)
NEUTROPHILS # BLD AUTO: 4.4 K/UL — SIGNIFICANT CHANGE UP (ref 1.8–7.4)
NEUTROPHILS NFR BLD AUTO: 72.4 % — SIGNIFICANT CHANGE UP (ref 43–77)
PLATELET # BLD AUTO: 161 K/UL — SIGNIFICANT CHANGE UP (ref 150–400)
POTASSIUM SERPL-MCNC: 4.7 MMOL/L — SIGNIFICANT CHANGE UP (ref 3.5–5.3)
POTASSIUM SERPL-SCNC: 4.7 MMOL/L — SIGNIFICANT CHANGE UP (ref 3.5–5.3)
PROT SERPL-MCNC: 7.8 G/DL — SIGNIFICANT CHANGE UP (ref 6–8.3)
PROTHROM AB SERPL-ACNC: 11.8 SEC — SIGNIFICANT CHANGE UP (ref 9.8–12.7)
RBC # BLD: 4.29 M/UL — SIGNIFICANT CHANGE UP (ref 4.2–5.8)
RBC # FLD: 13.8 % — SIGNIFICANT CHANGE UP (ref 10.3–14.5)
SODIUM SERPL-SCNC: 139 MMOL/L — SIGNIFICANT CHANGE UP (ref 135–145)
TROPONIN T, HIGH SENSITIVITY RESULT: 11 NG/L — SIGNIFICANT CHANGE UP (ref 0–51)
TROPONIN T, HIGH SENSITIVITY RESULT: 12 NG/L — SIGNIFICANT CHANGE UP (ref 0–51)
WBC # BLD: 6.2 K/UL — SIGNIFICANT CHANGE UP (ref 3.8–10.5)
WBC # FLD AUTO: 6.2 K/UL — SIGNIFICANT CHANGE UP (ref 3.8–10.5)

## 2018-08-17 PROCEDURE — 71275 CT ANGIOGRAPHY CHEST: CPT

## 2018-08-17 PROCEDURE — 85610 PROTHROMBIN TIME: CPT

## 2018-08-17 PROCEDURE — 99284 EMERGENCY DEPT VISIT MOD MDM: CPT | Mod: 25

## 2018-08-17 PROCEDURE — 72191 CT ANGIOGRAPH PELV W/O&W/DYE: CPT

## 2018-08-17 PROCEDURE — 80053 COMPREHEN METABOLIC PANEL: CPT

## 2018-08-17 PROCEDURE — 99284 EMERGENCY DEPT VISIT MOD MDM: CPT

## 2018-08-17 PROCEDURE — 70450 CT HEAD/BRAIN W/O DYE: CPT | Mod: 26

## 2018-08-17 PROCEDURE — 72191 CT ANGIOGRAPH PELV W/O&W/DYE: CPT | Mod: 26

## 2018-08-17 PROCEDURE — 71045 X-RAY EXAM CHEST 1 VIEW: CPT | Mod: 26

## 2018-08-17 PROCEDURE — 70450 CT HEAD/BRAIN W/O DYE: CPT

## 2018-08-17 PROCEDURE — 93005 ELECTROCARDIOGRAM TRACING: CPT

## 2018-08-17 PROCEDURE — 85730 THROMBOPLASTIN TIME PARTIAL: CPT

## 2018-08-17 PROCEDURE — 85027 COMPLETE CBC AUTOMATED: CPT

## 2018-08-17 PROCEDURE — 71275 CT ANGIOGRAPHY CHEST: CPT | Mod: 26

## 2018-08-17 PROCEDURE — 71045 X-RAY EXAM CHEST 1 VIEW: CPT

## 2018-08-17 PROCEDURE — 84484 ASSAY OF TROPONIN QUANT: CPT

## 2018-08-17 RX ORDER — LABETALOL HCL 100 MG
200 TABLET ORAL ONCE
Qty: 0 | Refills: 0 | Status: COMPLETED | OUTPATIENT
Start: 2018-08-17 | End: 2018-08-17

## 2018-08-17 RX ORDER — LOSARTAN POTASSIUM 100 MG/1
25 TABLET, FILM COATED ORAL ONCE
Qty: 0 | Refills: 0 | Status: COMPLETED | OUTPATIENT
Start: 2018-08-17 | End: 2018-08-17

## 2018-08-17 RX ORDER — LOSARTAN POTASSIUM 100 MG/1
25 TABLET, FILM COATED ORAL DAILY
Qty: 0 | Refills: 0 | Status: DISCONTINUED | OUTPATIENT
Start: 2018-08-17 | End: 2018-08-17

## 2018-08-17 RX ADMIN — Medication 200 MILLIGRAM(S): at 16:37

## 2018-08-17 RX ADMIN — LOSARTAN POTASSIUM 25 MILLIGRAM(S): 100 TABLET, FILM COATED ORAL at 16:37

## 2018-08-17 NOTE — ED ADULT NURSE REASSESSMENT NOTE - NS ED NURSE REASSESS COMMENT FT1
Patient resting comfortably with family at bedside. Patient BP over 200 at this time. EMA Nunez aware. Patient given PO blood pressure medications that he would normally take at home per PA medication order. Will continue to monitor.

## 2018-08-17 NOTE — ED PROVIDER NOTE - PLAN OF CARE
Follow up with your Primary Care Physician within the next 2-3 days and neurologist within 1 week of discharge  Bring a copy of your test results with you to your appointment  Continue your current medication regimen  Return to the Emergency Room if you experience new or worsening symptoms

## 2018-08-17 NOTE — ED ADULT NURSE NOTE - OBJECTIVE STATEMENT
74 y/o M, PMH, HTN on Labetalol and Losartan, CAD, MI s/p stents on Plavix, presents ambulatory to ED c/o 2 months of intermittent lightheadedness, heavy head sensation, numbness/tingling to arms/legs/whole body/face, "difficulty focusing." Pt's Cardiologist sent him for o/p head CT, Chest CT and CTAP approx 3 weeks ago, and pt was in [place] Wednesday night for high BP, given IV meds, got carotid dopplers and d/c home. Pt reports his BP always goes up and down, he never misses his meds. Also c/o feeling weak, malaise. Pt denies dizziness, chest pain, palpitations, cough, SOB, abdominal pain, fevers, chills at this time. Wife at bedside, safety maintained.

## 2018-08-17 NOTE — ED ADULT TRIAGE NOTE - CHIEF COMPLAINT QUOTE
Numbness and tingling x 2 months, worsening today, + lightheadedness and dizziness. Denies headaches. Recently seen at [facility] for elevated bp

## 2018-08-17 NOTE — ED ADULT NURSE NOTE - NSIMPLEMENTINTERV_GEN_ALL_ED
Implemented All Universal Safety Interventions:  [place] to call system. Call bell, personal items and telephone within reach. Instruct patient to call for assistance. Room bathroom lighting operational. Non-slip footwear when patient is off stretcher. Physically safe environment: no spills, clutter or unnecessary equipment. Stretcher in lowest position, wheels locked, appropriate side rails in place.

## 2018-08-17 NOTE — ED PROVIDER NOTE - MEDICAL DECISION MAKING DETAILS
oJnathan: Patient with sensation of tingling from head to toe b/l c/w prior symptoms and was recently evaluated at [facility] and had ct head and cta done which were negative for acute findings. Patient cardiac cath done this year negative. Patient states no change in symptoms. no neuro findings on exam, normal sensation b/l ue/le, no cp. c/o sob x 2 days. will get labs, cxr, cta chest/a/p, ct head. consult patient cards to reassess

## 2018-08-17 NOTE — ED ADULT NURSE NOTE - CHIEF COMPLAINT QUOTE
Numbness and tingling x 2 months, worsening today, + lightheadedness and dizziness. Denies headaches. Recently seen at [facility] for elevated bp

## 2018-08-17 NOTE — ED PROVIDER NOTE - CARE PLAN
Principal Discharge DX:	Paresthesia  Assessment and plan of treatment:	Follow up with your Primary Care Physician within the next 2-3 days and neurologist within 1 week of discharge  Bring a copy of your test results with you to your appointment  Continue your current medication regimen  Return to the Emergency Room if you experience new or worsening symptoms

## 2018-08-17 NOTE — ED PROVIDER NOTE - OBJECTIVE STATEMENT
72 y/o male PMHx CAD s/p 8 stents, HTN, HLD, GERD, MI, BPH presented to the ED 72 y/o male PMHx CAD s/p 8 stents (last placed 12/2017), HTN, HLD, GERD, MI, BPH presented to the ED c/o numbness/ paresthesias x3 months and SOB with DOEx2 days. Patient stated he has intermittent numbness/ parasthesias "from head to toes" for the past 3 months. Has not seen neurology for symptoms. Patient stated blood pressures have been in labile ranging from systolic 110s to 180s. Patient also c/o SOB at with PALM x2 days with intermittent left side CP described to be a "twinge". Patient stated he was seen at [place] ED 3 days ago had negative labs, received IV meds for /110s per family and Carotid doppler which revealed 50 % stenosis. Patient had CT 7/23/2018 which showed old lacunar infarct. CTA chest 7/10/18 revealed chronic dissection infrarenal abdominal aorta. Had cath 3/2018 which revealed EF 65% normal LV function. Patient denied N/V/D, abdominal pain, visual changes, neuro defecits, slurred speech, back pain, recent  travel >3 hours 74 y/o male PMHx CAD s/p 8 stents (last placed 12/2017), HTN, HLD, GERD, MI, BPH presented to the ED c/o numbness/ paresthesias x3 months and SOB with DOEx2 days. Patient stated he has intermittent numbness/ parasthesias "from head to toes" for the past 3 months. Has not seen neurology for symptoms. Patient stated blood pressures have been in labile ranging from systolic 110s to 180s. Patient also c/o SOB at with PALM x2 days with intermittent left side CP described to be a "twinge". Patient stated he was seen at [place] ED 3 days ago had negative labs, received IV meds for /110s per family and Carotid doppler which revealed 50 % stenosis. Patient had CTH 7/23/2018 which showed old lacunar infarct. CTA chest 7/10/18 revealed chronic dissection infrarenal abdominal aorta. Had cath 3/2018 which revealed EF 65% normal LV function. Patient denied N/V/D, abdominal pain, visual changes, neuro defecits, slurred speech, back pain, recent  travel >3 hours

## 2018-08-17 NOTE — ED PROVIDER NOTE - PROGRESS NOTE DETAILS
EMA Estes: Case discussed with cardiology Dr. Ez BOO. Awaiting call back from Dr. Chavis EMA Estes: Discussed case with Dr. Ez Gresham who recommended CTA and CTH. Will re-discuss case case with Dr. Ez Gresham after CT results (contact spectra [phone]/cell [phone]) EMA Estes: Patient noted to be hypertensive and due for anti-hypertensive medications.  Will give home meds and reassess EMA Estes: Paged Dr. Ez Gresham regarding patient discharge multiple times awaiting call back,. Will discharge EMA Estes: Discussed case with Dr. Ez Gresham and made aware of CT findings. Agreed patient safe to be discharged home with outpatient f/u

## 2018-10-16 NOTE — CONSULT NOTE ADULT - SUBJECTIVE AND OBJECTIVE BOX
[facility] - Vanesa Dumas, Severiano, Kae, Richard, Lucille Soto  Office Number: [phone]    Initial Consult Note    CHIEF COMPLAINT: Patient is a 72y old  Male who presents with a chief complaint of chest pain    HPI: This is a 72 y.o. M w/ a PMH of CAD (sp. 9 stents), HTN (labetol, losartan), GERD, Gout, BPH, previous MI in 2001 presents to the ED w/ dizziness, light-headedness and CP x 24 hrs.  Patient reports that increasing SOB w/ exertion over the past few weeks, increasing yesterday to SOB w/ dizziness, lightheadness and tingling over his entire body.  He reports that this morning these symptoms were accompanied by substernal chest pain that radiates to his back.  He reports that symptoms resolve w/ 5-10 minutes of rest.  He denies recent illness, fever, sore throat cough, palpitations, sweating, nausea, heart burn, vomiting, diarrhea, change in stool color, leg pain or leg swelling.  Additionally he reports that he has been needing more pillows in order to rest comfortably at night.      PAST MEDICAL & SURGICAL HISTORY:  Diverticulosis  MI (myocardial infarction)  Diverticulosis  BPH (benign prostatic hyperplasia)  Enlarged aorta  Gout  GERD (gastroesophageal reflux disease)  S/P coronary artery stent placement: 2001 LCx and RCA, 6/2014 &amp; 1/2015  MI, old: 2001  CAD (coronary artery disease)  Hypercholesteremia  HTN (hypertension)  S/P coronary angioplasty: 8 stents  CAD (coronary artery disease): 8 stents  Ureteral stone: Insertion of right  ureteral stent - 9/2015  S/P angioplasty with stent: x 3 ( 12/2001, 6/2014 &amp; 01/2015)  H/O tooth extraction  Cataract: s/p extraction bilat 2/2014      SOCIAL HISTORY:  No tobacco, ethanol, or drug abuse.    FAMILY HISTORY:  Family history of CABG: mother with CABG at 79yo  Family history of coronary artery disease (Mother)    No family history of acute MI or sudden cardiac death.    MEDICATIONS  (STANDING):  sodium chloride 0.9%. 1000 milliLiter(s) (125 mL/Hr) IV Continuous <Continuous>    MEDICATIONS  (PRN):      Allergies    No Known Drug Allergies  strawberry (Hives)    Intolerances        REVIEW OF SYSTEMS:    CONSTITUTIONAL: No weakness, fevers or chills  EYES/ENT: No visual changes;  No vertigo or throat pain   NECK: No pain or stiffness  RESPIRATORY: + SOB; No cough, wheezing, hemoptysis;   CARDIOVASCULAR: + CP; No palpitations  GASTROINTESTINAL: No abdominal pain. No nausea, vomiting, or hematemesis; No diarrhea or constipation. No melena or hematochezia.  GENITOURINARY: No dysuria, frequency or hematuria  NEUROLOGICAL: tingling of extremities w/ SOB; No numbness or weakness  SKIN: No itching or rash  All other review of systems is negative unless indicated above    VITAL SIGNS:   Vital Signs Last 24 Hrs  T(C): 36.4 (14 Mar 2018 09:51), Max: 36.4 (14 Mar 2018 09:51)  T(F): 97.6 (14 Mar 2018 09:51), Max: 97.6 (14 Mar 2018 09:51)  HR: 85 (14 Mar 2018 09:51) (85 - 85)  BP: 144/92 (14 Mar 2018 09:51) (144/92 - 144/92)  RR: 17 (14 Mar 2018 09:51) (17 - 17)  SpO2: 98% (14 Mar 2018 09:51) (98% - 98%)    I&O's Summary      On Exam:    Constitutional: NAD, alert and oriented x 3  Lungs:  Non-labored, breath sounds are clear bilaterally, No wheezing, rales or rhonchi  Cardiovascular:   Gastrointestinal: Bowel Sounds present, soft, nontender.   Lymph: No peripheral edema. No cervical lymphadenopathy.  Neurological: Alert, no focal deficits  Skin: No rashes or ulcers   Psych:  Mood & affect appropriate.    LABS: All Labs Reviewed:                        13.5   6.0   )-----------( 151      ( 14 Mar 2018 10:23 )             40.1     14 Mar 2018 10:23    139    |  107    |  17     ----------------------------<  118    4.4     |  23     |  1.20     Ca    9.1        14 Mar 2018 10:23    TPro  7.7    /  Alb  3.9    /  TBili  1.2    /  DBili  x      /  AST  29     /  ALT  29     /  AlkPhos  81     14 Mar 2018 10:23    PT/INR - ( 14 Mar 2018 10:23 )   PT: 12.0 sec;   INR: 1.10 ratio         PTT - ( 14 Mar 2018 10:23 )  PTT:26.0 sec  CARDIAC MARKERS ( 14 Mar 2018 10:23 )  <.015 ng/mL / x     / 124 U/L / x     / 1.3 ng/mL      Blood Culture:   03-14 @ 10:23  Pro Bnp 369        RADIOLOGY:    EKG: NSR [facility] - Vanesa Dumas, Kae العلي, Richard, Lucille Soto  Office Number: [phone]    Initial Consult Note    CHIEF COMPLAINT: Patient is a 72y old  Male who presents with a chief complaint of chest pain    HPI: This is a 72 y.o. M w/ a PMH of CAD (sp. 9 stents), HTN (labetol, losartan), GERD, Gout, BPH, previous MI in 2001 presents to the ED w/ dizziness, light-headedness and CP x 24 hrs.  Patient reports that increasing SOB w/ exertion over the past few days, increasing yesterday to SOB w/ dizziness, lightheadness and tingling over his entire body.  He reports that this morning these symptoms were accompanied by substernal chest pain that radiates to his back.  He reports that symptoms were aggravated by walking, but continued at rest.  He denies recent illness, fever, sore throat, cough, palpitations, sweating, nausea, heart burn, vomiting, diarrhea, change in stool color, leg pain or leg swelling.  Additionally he reports that he has been needing more pillows in order to rest comfortably at night.      PAST MEDICAL & SURGICAL HISTORY:  Diverticulosis  MI (myocardial infarction)  Diverticulosis  BPH (benign prostatic hyperplasia)  Enlarged aorta  Gout  GERD (gastroesophageal reflux disease)  S/P coronary artery stent placement: 2001 LCx and RCA, 6/2014 &amp; 1/2015  MI, old: 2001  CAD (coronary artery disease)  Hypercholesteremia  HTN (hypertension)  S/P coronary angioplasty: 8 stents  CAD (coronary artery disease): 8 stents  Ureteral stone: Insertion of right  ureteral stent - 9/2015  S/P angioplasty with stent: x 3 ( 12/2001, 6/2014 &amp; 01/2015)  H/O tooth extraction  Cataract: s/p extraction bilat 2/2014      SOCIAL HISTORY:  No tobacco, ethanol, or drug abuse.    FAMILY HISTORY:  Family history of CABG: mother with CABG at 79yo  Family history of coronary artery disease (Mother)    No family history of acute MI or sudden cardiac death.    MEDICATIONS  (STANDING):  sodium chloride 0.9%. 1000 milliLiter(s) (125 mL/Hr) IV Continuous <Continuous>    MEDICATIONS  (PRN):      Allergies    No Known Drug Allergies  strawberry (Hives)    Intolerances        REVIEW OF SYSTEMS:    CONSTITUTIONAL: No weakness, fevers or chills  EYES/ENT: No visual changes;  No vertigo or throat pain   NECK: No pain or stiffness  RESPIRATORY: + SOB; No cough, wheezing, hemoptysis;   CARDIOVASCULAR: + CP; No palpitations  GASTROINTESTINAL: No abdominal pain. No nausea, vomiting, or hematemesis; No diarrhea or constipation. No melena or hematochezia.  GENITOURINARY: No dysuria, frequency or hematuria  NEUROLOGICAL: tingling of extremities w/ SOB; No numbness or weakness  SKIN: No itching or rash  All other review of systems is negative unless indicated above    VITAL SIGNS:   Vital Signs Last 24 Hrs  T(C): 36.4 (14 Mar 2018 09:51), Max: 36.4 (14 Mar 2018 09:51)  T(F): 97.6 (14 Mar 2018 09:51), Max: 97.6 (14 Mar 2018 09:51)  HR: 85 (14 Mar 2018 09:51) (85 - 85)  BP: 144/92 (14 Mar 2018 09:51) (144/92 - 144/92)  RR: 17 (14 Mar 2018 09:51) (17 - 17)  SpO2: 98% (14 Mar 2018 09:51) (98% - 98%)    I&O's Summary      On Exam:    Constitutional: NAD, alert and oriented x 3  Lungs:  Non-labored, breath sounds are clear bilaterally, No wheezing, rales or rhonchi  Cardiovascular:   Gastrointestinal: Bowel Sounds present, soft, nontender.   Lymph: No peripheral edema. No cervical lymphadenopathy.  Neurological: Alert, no focal deficits  Skin: No rashes or ulcers   Psych:  Mood & affect appropriate.    LABS: All Labs Reviewed:                        13.5   6.0   )-----------( 151      ( 14 Mar 2018 10:23 )             40.1     14 Mar 2018 10:23    139    |  107    |  17     ----------------------------<  118    4.4     |  23     |  1.20     Ca    9.1        14 Mar 2018 10:23    TPro  7.7    /  Alb  3.9    /  TBili  1.2    /  DBili  x      /  AST  29     /  ALT  29     /  AlkPhos  81     14 Mar 2018 10:23    PT/INR - ( 14 Mar 2018 10:23 )   PT: 12.0 sec;   INR: 1.10 ratio         PTT - ( 14 Mar 2018 10:23 )  PTT:26.0 sec  CARDIAC MARKERS ( 14 Mar 2018 10:23 )  <.015 ng/mL / x     / 124 U/L / x     / 1.3 ng/mL      Blood Culture:   03-14 @ 10:23  Pro Bnp 369        RADIOLOGY:    EKG: NSR denies pain/discomfort

## 2018-10-22 ENCOUNTER — APPOINTMENT (OUTPATIENT)
Dept: GASTROENTEROLOGY | Facility: CLINIC | Age: 73
End: 2018-10-22
Payer: MEDICARE

## 2018-10-22 VITALS
TEMPERATURE: 98 F | HEART RATE: 82 BPM | BODY MASS INDEX: 25.61 KG/M2 | HEIGHT: 68 IN | RESPIRATION RATE: 16 BRPM | DIASTOLIC BLOOD PRESSURE: 63 MMHG | WEIGHT: 169 LBS | SYSTOLIC BLOOD PRESSURE: 128 MMHG | OXYGEN SATURATION: 98 %

## 2018-10-22 DIAGNOSIS — K57.92 DIVERTICULITIS OF INTESTINE, PART UNSPECIFIED, W/OUT PERFORATION OR ABSCESS W/OUT BLEEDING: ICD-10-CM

## 2018-10-22 DIAGNOSIS — Z09 ENCOUNTER FOR FOLLOW-UP EXAMINATION AFTER COMPLETED TREATMENT FOR CONDITIONS OTHER THAN MALIGNANT NEOPLASM: ICD-10-CM

## 2018-10-22 DIAGNOSIS — R07.9 CHEST PAIN, UNSPECIFIED: ICD-10-CM

## 2018-10-22 PROCEDURE — 99214 OFFICE O/P EST MOD 30 MIN: CPT

## 2018-10-22 RX ORDER — OXYBUTYNIN CHLORIDE 10 MG/1
10 TABLET, EXTENDED RELEASE ORAL DAILY
Qty: 30 | Refills: 5 | Status: DISCONTINUED | COMMUNITY
Start: 2018-07-13 | End: 2018-10-22

## 2018-10-25 NOTE — H&P CARDIOLOGY - AS BP NONINV METHOD
Problem: Knowledge Deficit  Goal: Patient/family/caregiver demonstrates understanding of disease process, treatment plan, medications, and discharge instructions  Complete learning assessment and assess knowledge base    Interventions:  - Provide teaching at level of understanding  - Provide teaching via preferred learning methods  Outcome: Progressing electronic

## 2018-11-12 ENCOUNTER — OUTPATIENT (OUTPATIENT)
Dept: OUTPATIENT SERVICES | Facility: HOSPITAL | Age: 73
LOS: 1 days | End: 2018-11-12
Payer: MEDICARE

## 2018-11-12 DIAGNOSIS — N20.1 CALCULUS OF URETER: Chronic | ICD-10-CM

## 2018-11-12 DIAGNOSIS — K80.20 CALCULUS OF GALLBLADDER WITHOUT CHOLECYSTITIS WITHOUT OBSTRUCTION: ICD-10-CM

## 2018-11-12 DIAGNOSIS — K08.409 PARTIAL LOSS OF TEETH, UNSPECIFIED CAUSE, UNSPECIFIED CLASS: Chronic | ICD-10-CM

## 2018-11-12 DIAGNOSIS — Z98.89 OTHER SPECIFIED POSTPROCEDURAL STATES: Chronic | ICD-10-CM

## 2018-11-12 DIAGNOSIS — I25.10 ATHEROSCLEROTIC HEART DISEASE OF NATIVE CORONARY ARTERY WITHOUT ANGINA PECTORIS: Chronic | ICD-10-CM

## 2018-11-12 DIAGNOSIS — Z98.61 CORONARY ANGIOPLASTY STATUS: Chronic | ICD-10-CM

## 2018-11-12 DIAGNOSIS — H26.9 UNSPECIFIED CATARACT: Chronic | ICD-10-CM

## 2018-11-12 PROCEDURE — 76700 US EXAM ABDOM COMPLETE: CPT | Mod: 26

## 2018-11-12 PROCEDURE — 76700 US EXAM ABDOM COMPLETE: CPT

## 2019-06-18 ENCOUNTER — APPOINTMENT (OUTPATIENT)
Dept: UROLOGY | Facility: CLINIC | Age: 74
End: 2019-06-18

## 2019-09-02 PROBLEM — Z09 FOLLOW UP: Status: ACTIVE | Noted: 2017-02-21

## 2019-09-06 NOTE — DISCHARGE NOTE ADULT - MEDICATION SUMMARY - MEDICATIONS TO STOP TAKING
I will STOP taking the medications listed below when I get home from the hospital:    losartan 50 mg oral tablet  -- 1 tab(s) by mouth once a day Scribe Attestation (For Scribes USE Only)... Scribe Attestation (For Scribes USE Only).../Attending Attestation (For Attendings USE Only)...

## 2019-10-22 ENCOUNTER — FORM ENCOUNTER (OUTPATIENT)
Age: 74
End: 2019-10-22

## 2019-10-23 ENCOUNTER — APPOINTMENT (OUTPATIENT)
Dept: PULMONOLOGY | Facility: CLINIC | Age: 74
End: 2019-10-23
Payer: MEDICARE

## 2019-10-23 VITALS
DIASTOLIC BLOOD PRESSURE: 85 MMHG | HEIGHT: 68 IN | OXYGEN SATURATION: 98 % | SYSTOLIC BLOOD PRESSURE: 158 MMHG | HEART RATE: 113 BPM | WEIGHT: 165 LBS | BODY MASS INDEX: 25.01 KG/M2

## 2019-10-23 DIAGNOSIS — R05 COUGH: ICD-10-CM

## 2019-10-23 PROCEDURE — 94750: CPT

## 2019-10-23 PROCEDURE — 94729 DIFFUSING CAPACITY: CPT

## 2019-10-23 PROCEDURE — 99204 OFFICE O/P NEW MOD 45 MIN: CPT | Mod: 25

## 2019-10-23 PROCEDURE — 94060 EVALUATION OF WHEEZING: CPT

## 2019-10-23 PROCEDURE — 94726 PLETHYSMOGRAPHY LUNG VOLUMES: CPT

## 2019-10-23 RX ORDER — AMLODIPINE BESYLATE 5 MG/1
5 TABLET ORAL
Refills: 0 | Status: ACTIVE | COMMUNITY

## 2019-10-23 RX ORDER — METOPROLOL SUCCINATE 200 MG/1
200 TABLET, EXTENDED RELEASE ORAL
Refills: 0 | Status: ACTIVE | COMMUNITY

## 2019-10-23 NOTE — REASON FOR VISIT
[Initial Evaluation] : an initial evaluation [Shortness of Breath] : shortness of Breath [Cough] : cough

## 2019-10-23 NOTE — REVIEW OF SYSTEMS
[Recent Wt Gain (___ Lbs)] : recent [unfilled] ~Ulb weight gain [As Noted in HPI] : as noted in HPI [Cough] : cough [Indigestion] : indigestion [Negative] : Sleep Disorder [Sputum] : not coughing up ~M sputum [FreeTextEntry7] : belching

## 2019-10-23 NOTE — PHYSICAL EXAM
[General Appearance - Well Developed] : well developed [Well Groomed] : well groomed [Normal Appearance] : normal appearance [General Appearance - Well Nourished] : well nourished [General Appearance - In No Acute Distress] : no acute distress [No Deformities] : no deformities [Eyelids - No Xanthelasma] : the eyelids demonstrated no xanthelasmas [Normal Conjunctiva] : the conjunctiva exhibited no abnormalities [Normal Oropharynx] : normal oropharynx [Neck Appearance] : the appearance of the neck was normal [Neck Cervical Mass (___cm)] : no neck mass was observed [Jugular Venous Distention Increased] : there was no jugular-venous distention [Thyroid Diffuse Enlargement] : the thyroid was not enlarged [Thyroid Nodule] : there were no palpable thyroid nodules [Heart Rate And Rhythm] : heart rate and rhythm were normal [Heart Sounds] : normal S1 and S2 [Murmurs] : no murmurs present [Respiration, Rhythm And Depth] : normal respiratory rhythm and effort [Auscultation Breath Sounds / Voice Sounds] : lungs were clear to auscultation bilaterally [Exaggerated Use Of Accessory Muscles For Inspiration] : no accessory muscle use [Abdomen Soft] : soft [Abdomen Tenderness] : non-tender [Abdomen Mass (___ Cm)] : no abdominal mass palpated [Abnormal Walk] : normal gait [Gait - Sufficient For Exercise Testing] : the gait was sufficient for exercise testing [Cyanosis, Localized] : no localized cyanosis [Nail Clubbing] : no clubbing of the fingernails [Petechial Hemorrhages (___cm)] : no petechial hemorrhages [Skin Color & Pigmentation] : normal skin color and pigmentation [Skin Turgor] : normal skin turgor [] : no rash [Deep Tendon Reflexes (DTR)] : deep tendon reflexes were 2+ and symmetric [Sensation] : the sensory exam was normal to light touch and pinprick [No Focal Deficits] : no focal deficits [Oriented To Time, Place, And Person] : oriented to person, place, and time [Impaired Insight] : insight and judgment were intact [Affect] : the affect was normal

## 2019-10-23 NOTE — ASSESSMENT
[FreeTextEntry1] : Cough with reportedly normal imaging\par Have requested records for review\par No evidence of asthma\par Recommend trial of nasal steroids

## 2019-10-23 NOTE — HISTORY OF PRESENT ILLNESS
[FreeTextEntry1] : Patient referred for evaluation of cough and shortness of breath. Several month history of intermittent cough and brief episodes of dyspnea. Notes a sense of congestion coughs and feels better. This has been going on for several months. While this was happening he developed a different symptom of chest pressure and more severe shortness of breath. He was hospitalized underwent cardiac catheterization with opening of a previous stent. The more acute symptoms resolved but is more chronic symptoms have remained. Reportedly he had a negative chest x-ray and chest CT while in the hospital. He is a former smoker having quit in 1988 he denies occupational exposure to some toxins. He was treated in the past for acid reflux but is not currently on this medication. He does complain of nasal drip symptoms.

## 2019-11-29 NOTE — PATIENT PROFILE ADULT. - FUNCTIONAL SCREEN CURRENT LEVEL: TRANSFERRING, MLM
----- Message from Alejandra Cotter RN sent at 11/28/2019  8:50 AM CST -----  A new Nurse Triage encounter of type Fever - 3 Months or Older has been   submitted for patient Judit Lee.  The full triage details can be located   in Chart Review.   (2) assistive person

## 2020-07-24 NOTE — ED PROVIDER NOTE - CPE EDP EYES NORM
What Type Of Note Output Would You Prefer (Optional)?: Bullet Format
How Severe Is Your Skin Lesion?: mild
Has Your Skin Lesion Been Treated?: not been treated
Is This A New Presentation, Or A Follow-Up?: Skin Lesion
normal...

## 2021-03-29 NOTE — ED PROVIDER NOTE - SKIN NEGATIVE STATEMENT, MLM
3rd and final attempt to reach patient. Should complete labs for further refills   no abrasions, no jaundice, no lesions, no pruritis, and no rashes.

## 2021-04-12 NOTE — H&P ADULT - NSHPPHYSICALEXAM_GEN_ALL_CORE
Vital Signs Last 24 Hrs  T(C): 37.2 (25 Jan 2018 15:34), Max: 37.2 (25 Jan 2018 15:34)  T(F): 99 (25 Jan 2018 15:34), Max: 99 (25 Jan 2018 15:34)  HR: 76 (25 Jan 2018 15:34) (71 - 100)  BP: 142/85 (25 Jan 2018 15:34) (138/92 - 167/93)  BP(mean): --  RR: 18 (25 Jan 2018 15:34) (17 - 18)  SpO2: 95% (25 Jan 2018 15:34) (95% - 100%)    PHYSICAL EXAM:  GENERAL: NAD, well-developed  HEAD:  Atraumatic, Normocephalic  EYES: EOMI, PERRLA, conjunctiva and sclera clear  NECK: Supple, No JVD  CHEST/LUNG: Clear to auscultation bilaterally; No wheeze  HEART: Regular rate and rhythm; No murmurs, rubs, or gallops  ABDOMEN: Soft, Nontender, Nondistended; Bowel sounds present  EXTREMITIES:  2+ Peripheral Pulses, No clubbing, cyanosis, or edema  PSYCH: AAOx3  NEUROLOGY: non-focal  SKIN: No rashes or lesions Propranolol Pregnancy And Lactation Text: This medication is Pregnancy Category C and it isn't known if it is safe during pregnancy. It is excreted in breast milk.

## 2021-05-17 NOTE — ED ADULT NURSE NOTE - NS ED NURSE REPORT GIVEN DT
Area M Indication Text: Tumors in this location are included in Area M (cheek, forehead, scalp, neck, jawline and pretibial skin).  Mohs surgery is indicated for tumors in these anatomic locations. 19-Dec-2017 18:10

## 2021-08-25 NOTE — ED ADULT NURSE NOTE - PMH
Parent BPH (benign prostatic hyperplasia)    CAD (coronary artery disease)    Diverticulosis    Diverticulosis    Enlarged aorta    GERD (gastroesophageal reflux disease)    Gout    HTN (hypertension)    Hypercholesteremia    MI (myocardial infarction)    MI, old  2001  S/P coronary artery stent placement  2001 LCx and RCA, 6/2014 & 1/2015

## 2021-09-28 NOTE — ED ADULT NURSE NOTE - BREATHING, MLM
Spontaneous, unlabored and symmetrical Island Pedicle Flap With Canthal Suspension Text: The defect edges were debeveled with a #15 scalpel blade.  Given the location of the defect, shape of the defect and the proximity to free margins an island pedicle advancement flap was deemed most appropriate.  Using a sterile surgical marker, an appropriate advancement flap was drawn incorporating the defect, outlining the appropriate donor tissue and placing the expected incisions within the relaxed skin tension lines where possible. The area thus outlined was incised deep to adipose tissue with a #15 scalpel blade.  The skin margins were undermined to an appropriate distance in all directions around the primary defect and laterally outward around the island pedicle utilizing iris scissors.  There was minimal undermining beneath the pedicle flap. A suspension suture was placed in the canthal tendon to prevent tension and prevent ectropion.

## 2021-11-08 NOTE — ED PROVIDER NOTE - CHPI ED SYMPTOMS POS
[facility] PROCEDURE  REMOVAL OF MASS        PATIENT NAME:  Fbaiola Otoole  YOB: 1967  DATE OF SERVICE:  11/2/2021   SURGEON: Everardo Suggs MD   ASSISTANT: EPISTAXIS

## 2021-12-14 ENCOUNTER — APPOINTMENT (OUTPATIENT)
Dept: UROLOGY | Facility: CLINIC | Age: 76
End: 2021-12-14
Payer: MEDICARE

## 2021-12-14 VITALS
HEIGHT: 67 IN | RESPIRATION RATE: 17 BRPM | TEMPERATURE: 97.9 F | WEIGHT: 163 LBS | BODY MASS INDEX: 25.58 KG/M2 | SYSTOLIC BLOOD PRESSURE: 135 MMHG | HEART RATE: 84 BPM | DIASTOLIC BLOOD PRESSURE: 71 MMHG

## 2021-12-14 DIAGNOSIS — Z87.442 PERSONAL HISTORY OF URINARY CALCULI: ICD-10-CM

## 2021-12-14 DIAGNOSIS — Z12.5 ENCOUNTER FOR SCREENING FOR MALIGNANT NEOPLASM OF PROSTATE: ICD-10-CM

## 2021-12-14 DIAGNOSIS — N13.8 BENIGN PROSTATIC HYPERPLASIA WITH LOWER URINARY TRACT SYMPMS: ICD-10-CM

## 2021-12-14 DIAGNOSIS — N40.1 BENIGN PROSTATIC HYPERPLASIA WITH LOWER URINARY TRACT SYMPMS: ICD-10-CM

## 2021-12-14 DIAGNOSIS — R39.15 URGENCY OF URINATION: ICD-10-CM

## 2021-12-14 PROCEDURE — 99204 OFFICE O/P NEW MOD 45 MIN: CPT | Mod: 25

## 2021-12-14 PROCEDURE — 51798 US URINE CAPACITY MEASURE: CPT

## 2021-12-14 RX ORDER — ROSUVASTATIN CALCIUM 5 MG/1
TABLET, FILM COATED ORAL
Refills: 0 | Status: ACTIVE | COMMUNITY

## 2021-12-14 RX ORDER — METOPROLOL TARTRATE 100 MG/1
100 TABLET, FILM COATED ORAL
Refills: 0 | Status: COMPLETED | COMMUNITY
End: 2021-12-14

## 2021-12-14 RX ORDER — TICAGRELOR 90 MG/1
90 TABLET ORAL
Refills: 0 | Status: COMPLETED | COMMUNITY
End: 2021-12-14

## 2021-12-14 RX ORDER — FLUTICASONE PROPIONATE 50 UG/1
50 SPRAY, METERED NASAL
Qty: 1 | Refills: 1 | Status: COMPLETED | COMMUNITY
Start: 2019-10-23 | End: 2021-12-14

## 2021-12-14 RX ORDER — CLOPIDOGREL 75 MG/1
TABLET, FILM COATED ORAL
Refills: 0 | Status: ACTIVE | COMMUNITY

## 2021-12-15 LAB
APPEARANCE: CLEAR
BACTERIA: NEGATIVE
BILIRUBIN URINE: NEGATIVE
BLOOD URINE: NEGATIVE
COLOR: NORMAL
GLUCOSE QUALITATIVE U: NEGATIVE
HYALINE CASTS: 1 /LPF
KETONES URINE: NEGATIVE
LEUKOCYTE ESTERASE URINE: NEGATIVE
MICROSCOPIC-UA: NORMAL
NITRITE URINE: NEGATIVE
PH URINE: 5.5
PROTEIN URINE: NEGATIVE
RED BLOOD CELLS URINE: 1 /HPF
SPECIFIC GRAVITY URINE: 1.02
SQUAMOUS EPITHELIAL CELLS: 0 /HPF
UROBILINOGEN URINE: NORMAL
WHITE BLOOD CELLS URINE: 1 /HPF

## 2022-01-01 NOTE — ED PROVIDER NOTE - CROS ED CONS ALL NEG
TM had exempt test 12/28 which was positive.    Post Positive letter sent and copied to leader.  
negative...

## 2022-03-22 NOTE — CONSULT NOTE ADULT - CONSULT REASON
Focal Dissection Flap Abdominal Aorta Proximal to Bifurcation
back pain chear hypertenison
DISPLAY PLAN FREE TEXT

## 2022-10-12 ENCOUNTER — APPOINTMENT (OUTPATIENT)
Dept: GASTROENTEROLOGY | Facility: CLINIC | Age: 77
End: 2022-10-12

## 2022-10-12 VITALS
SYSTOLIC BLOOD PRESSURE: 123 MMHG | TEMPERATURE: 98.2 F | OXYGEN SATURATION: 96 % | HEART RATE: 96 BPM | DIASTOLIC BLOOD PRESSURE: 77 MMHG | BODY MASS INDEX: 25.58 KG/M2 | WEIGHT: 163 LBS | HEIGHT: 67 IN

## 2022-10-12 DIAGNOSIS — R10.11 RIGHT UPPER QUADRANT PAIN: ICD-10-CM

## 2022-10-12 PROCEDURE — 99204 OFFICE O/P NEW MOD 45 MIN: CPT

## 2022-10-12 RX ORDER — TAMSULOSIN HYDROCHLORIDE 0.4 MG/1
0.4 CAPSULE ORAL
Qty: 90 | Refills: 3 | Status: DISCONTINUED | COMMUNITY
Start: 2021-12-14 | End: 2022-10-12

## 2022-10-12 NOTE — REASON FOR VISIT
[Consultation] : a consultation visit [Spouse] : spouse [FreeTextEntry1] : 78yo male c/o abdominal pian RLQ radiating to LLQ and lower back - h/o Calculus of gallbladder confirned by CT scan + US of abdomen 9/2022

## 2022-10-12 NOTE — REVIEW OF SYSTEMS
[As Noted in HPI] : as noted in HPI [Abdominal Pain] : abdominal pain [Constipation] : constipation [Heartburn] : heartburn [Negative] : Heme/Lymph [Vomiting] : no vomiting [Diarrhea] : no diarrhea [Melena (black stool)] : no melena [Bleeding] : no bleeding [Fecal Incontinence (soiling)] : no fecal incontinence [FreeTextEntry5] : h/o htn, hdl and cad x 8 stents (last placed 8/2020 @ [facility]) [FreeTextEntry7] : newly dx calculus of gallbladder

## 2022-10-12 NOTE — HISTORY OF PRESENT ILLNESS
[FreeTextEntry1] : 78yo male with medical h/o CAD w/cardiac stentx9 on Plavix, Gout, HTN, HDL and GERD, c/o abdominal pain RLQ radiating to LLQ and lower back - h/o Calculus of gallbladder confirmed by CT scan of abdomen 9/2022.\par Pt reports he was seen at [place] [facility] yesterday 10/11/222 for excruciating right abdominal pain and had labs and Abd U/S done and dx with Cholelithiasis

## 2022-10-12 NOTE — PHYSICAL EXAM
[Alert] : alert [Normal Voice/Communication] : normal voice/communication [Healthy Appearing] : healthy appearing [No Acute Distress] : no acute distress [Sclera] : the sclera and conjunctiva were normal [Hearing Threshold Finger Rub Not [place]] : hearing was normal [Normal Lips/Gums] : the lips and gums were normal [Oropharynx] : the oropharynx was normal [Normal Appearance] : the appearance of the neck was normal [No Neck Mass] : no neck mass was observed [No Respiratory Distress] : no respiratory distress [No Acc Muscle Use] : no accessory muscle use [Respiration, Rhythm And Depth] : normal respiratory rhythm and effort [Auscultation Breath Sounds / Voice Sounds] : lungs were clear to auscultation bilaterally [Heart Rate And Rhythm] : heart rate was normal and rhythm regular [Normal S1, S2] : normal S1 and S2 [Murmurs] : no murmurs [Bowel Sounds] : normal bowel sounds [No Masses] : no abdominal mass palpated [RUQ] : RUQ [RLQ] : RLQ [Epigastric] : epigastric [Cervical Lymph Nodes Enlarged Posterior Bilaterally] : no posterior cervical lymphadenopathy [No CVA Tenderness] : no CVA  tenderness [No Spinal Tenderness] : no spinal tenderness [Abnormal Walk] : normal gait [Normal Color / Pigmentation] : normal skin color and pigmentation [Cranial Nerves Intact] : cranial nerves 2-12 were intact [Oriented To Time, Place, And Person] : oriented to person, place, and time [Ascites: ___] : no ascites [de-identified] : deferred

## 2022-10-12 NOTE — ASSESSMENT
[FreeTextEntry1] : 76yo male with medical h/o CAD w/cardiac stentx9 on Plavix (last stent placed 2020), Gout, HTN, HDL and GERD, c/o abdominal pain RLQ radiating to LLQ and lower back - h/o Calculus of gallbladder confirmed by CT scan of abdomen 9/2022.\par Pt reports he was seen at [facility] yesterday 10/11/222 for excruciating right abdominal pain and had labs and Abd U/S done and dx with Cholelithiasis.\par On evaluation today, marked tenderness to RUQ + RLQ abdomen\par PLAN:\par For surgical consultation ASAP - Dr. Whittington will discuss w/surgeon at [facility] for pt's procedure\par Pending: work -up from [facility], visit 10/11/2022\par CT scan + US of Abdomen done (pt's copy) scanned into computer\par Labs + MRCP w/wo contract to be done - orders made\par All pt's questions asked

## 2022-11-07 ENCOUNTER — APPOINTMENT (OUTPATIENT)
Dept: GASTROENTEROLOGY | Facility: CLINIC | Age: 77
End: 2022-11-07

## 2022-11-07 VITALS
HEIGHT: 60 IN | HEART RATE: 91 BPM | WEIGHT: 157 LBS | SYSTOLIC BLOOD PRESSURE: 125 MMHG | BODY MASS INDEX: 30.82 KG/M2 | TEMPERATURE: 98.1 F | OXYGEN SATURATION: 96 % | DIASTOLIC BLOOD PRESSURE: 81 MMHG

## 2022-11-07 DIAGNOSIS — R14.0 ABDOMINAL DISTENSION (GASEOUS): ICD-10-CM

## 2022-11-07 DIAGNOSIS — K80.20 CALCULUS OF GALLBLADDER W/OUT CHOLECYSTITIS W/OUT OBSTRUCTION: ICD-10-CM

## 2022-11-07 PROCEDURE — 99214 OFFICE O/P EST MOD 30 MIN: CPT

## 2022-11-07 NOTE — PHYSICAL EXAM
[Alert] : alert [Normal Voice/Communication] : normal voice/communication [Healthy Appearing] : healthy appearing [No Acute Distress] : no acute distress [Sclera] : the sclera and conjunctiva were normal [Hearing Threshold Finger Rub Not [place]] : hearing was normal [Normal Lips/Gums] : the lips and gums were normal [Oropharynx] : the oropharynx was normal [Normal Appearance] : the appearance of the neck was normal [No Neck Mass] : no neck mass was observed [No Respiratory Distress] : no respiratory distress [No Acc Muscle Use] : no accessory muscle use [Respiration, Rhythm And Depth] : normal respiratory rhythm and effort [Auscultation Breath Sounds / Voice Sounds] : lungs were clear to auscultation bilaterally [Heart Rate And Rhythm] : heart rate was normal and rhythm regular [Normal S1, S2] : normal S1 and S2 [Murmurs] : no murmurs [Bowel Sounds] : normal bowel sounds [Abdomen Tenderness] : non-tender [No Masses] : no abdominal mass palpated [Abdomen Soft] : soft [] : no hepatosplenomegaly [No CVA Tenderness] : no CVA  tenderness [No Spinal Tenderness] : no spinal tenderness [Abnormal Walk] : normal gait [Oriented To Time, Place, And Person] : oriented to person, place, and time

## 2022-11-07 NOTE — REVIEW OF SYSTEMS
[Chest Pain] : chest pain [As Noted in HPI] : as noted in HPI [Negative] : Heme/Lymph [FreeTextEntry5] : CAD with multiple stents [FreeTextEntry8] : BPH

## 2022-11-07 NOTE — HISTORY OF PRESENT ILLNESS
[FreeTextEntry1] : Patient is a 77-year-old male with history of CAD with multiple cardiac stents on Plavix.  He also has a history of gout, hypertension, hyperlipidemia, and GERD.\par He is status postcholecystectomy and also ERCP and sphincterotomy with removal of common bile duct stones.  He still has a stent in his bile duct which will be removed next month.  Surgery was 3 weeks ago.  He is still having some residual mid abdominal discomfort and some gassiness.  His appetite is returning.  His bowel movements are good.

## 2022-11-07 NOTE — REASON FOR VISIT
[Follow-up] : a follow-up of an existing diagnosis [FreeTextEntry1] : S/P cholecystectomy, s/p ERCP and CBD stone extraction

## 2022-11-07 NOTE — ASSESSMENT
[FreeTextEntry1] : Patient is status post cholecystectomy and removal of common bile duct stones.  He still has a stent in the bile duct which will be removed next month.  He is regaining his strength and appetite.  He does complain of some abdominal pressure but these symptoms may be related to postoperative complaints.  We will continue to monitor his symptoms.  He is scheduled to have blood work this week.

## 2022-12-08 ENCOUNTER — RX ONLY (RX ONLY)
Age: 77
End: 2022-12-08

## 2022-12-08 ENCOUNTER — OFFICE (OUTPATIENT)
Dept: URBAN - METROPOLITAN AREA CLINIC 109 | Facility: CLINIC | Age: 77
Setting detail: OPHTHALMOLOGY
End: 2022-12-08
Payer: MEDICARE

## 2022-12-08 DIAGNOSIS — H16.223: ICD-10-CM

## 2022-12-08 PROCEDURE — 99203 OFFICE O/P NEW LOW 30 MIN: CPT | Performed by: OPHTHALMOLOGY

## 2022-12-08 ASSESSMENT — REFRACTION_AUTOREFRACTION
OD_AXIS: 109
OS_SPHERE: +1.00
OS_CYLINDER: -1.25
OD_SPHERE: +1.00
OS_AXIS: 66
OD_CYLINDER: -2.00

## 2022-12-08 ASSESSMENT — SUPERFICIAL PUNCTATE KERATITIS (SPK)
OD_SPK: T
OS_SPK: T

## 2022-12-08 ASSESSMENT — TONOMETRY
OD_IOP_MMHG: 19
OS_IOP_MMHG: 19

## 2022-12-08 ASSESSMENT — REFRACTION_CURRENTRX
OS_CYLINDER: -1.75
OS_OVR_VA: 20/
OD_AXIS: 106
OD_CYLINDER: -2.00
OD_OVR_VA: 20/
OS_ADD: +1.75
OS_SPHERE: +1.50
OS_AXIS: 67
OD_ADD: +1.75
OD_SPHERE: +1.50

## 2022-12-08 ASSESSMENT — SPHEQUIV_DERIVED
OS_SPHEQUIV: 0.375
OD_SPHEQUIV: 0

## 2022-12-08 ASSESSMENT — CONFRONTATIONAL VISUAL FIELD TEST (CVF)
OD_FINDINGS: FULL
OS_FINDINGS: FULL

## 2022-12-08 ASSESSMENT — VISUAL ACUITY
OS_BCVA: 20/25
OD_BCVA: 20/25

## 2023-01-09 ENCOUNTER — APPOINTMENT (OUTPATIENT)
Dept: GASTROENTEROLOGY | Facility: CLINIC | Age: 78
End: 2023-01-09
Payer: MEDICARE

## 2023-01-09 VITALS
DIASTOLIC BLOOD PRESSURE: 74 MMHG | TEMPERATURE: 98 F | OXYGEN SATURATION: 97 % | HEART RATE: 91 BPM | WEIGHT: 157 LBS | SYSTOLIC BLOOD PRESSURE: 102 MMHG | HEIGHT: 68 IN | BODY MASS INDEX: 23.79 KG/M2

## 2023-01-09 DIAGNOSIS — Z09 ENCOUNTER FOR FOLLOW-UP EXAMINATION AFTER COMPLETED TREATMENT FOR CONDITIONS OTHER THAN MALIGNANT NEOPLASM: ICD-10-CM

## 2023-01-09 PROCEDURE — 99214 OFFICE O/P EST MOD 30 MIN: CPT

## 2023-01-09 RX ORDER — OXYCODONE 5 MG/1
5 TABLET ORAL
Qty: 8 | Refills: 0 | Status: DISCONTINUED | COMMUNITY
Start: 2022-10-20 | End: 2023-01-09

## 2023-01-09 RX ORDER — SIMETHICONE 250 MG/1
250 CAPSULE, GELATIN COATED ORAL
Qty: 90 | Refills: 0 | Status: DISCONTINUED | OUTPATIENT
Start: 2022-11-07 | End: 2023-01-09

## 2023-01-09 RX ORDER — URSODIOL 500 MG/1
500 TABLET ORAL TWICE DAILY
Qty: 60 | Refills: 5 | Status: ACTIVE | COMMUNITY
Start: 2023-01-09 | End: 1900-01-01

## 2023-01-09 RX ORDER — GEMFIBROZIL 600 MG/1
TABLET, FILM COATED ORAL
Refills: 0 | Status: DISCONTINUED | COMMUNITY
End: 2023-01-09

## 2023-01-09 RX ORDER — LACTOBACILLUS RHAMNOSUS GG 10B CELL
CAPSULE ORAL
Qty: 30 | Refills: 0 | Status: DISCONTINUED | OUTPATIENT
Start: 2022-11-07 | End: 2023-01-09

## 2023-01-09 RX ORDER — OMEPRAZOLE 40 MG/1
40 CAPSULE, DELAYED RELEASE ORAL
Qty: 30 | Refills: 0 | Status: DISCONTINUED | COMMUNITY
Start: 2022-10-01 | End: 2023-01-09

## 2023-01-09 NOTE — HISTORY OF PRESENT ILLNESS
[FreeTextEntry1] : Patient is a 77-year-old male with history of CAD with multiple cardiac stents on Plavix.  He also has a history of gout, hypertension, hyperlipidemia, and GERD.\par He is status postcholecystectomy and also ERCP and sphincterotomy with removal of common bile duct stones.  Patient had CBD stent removed on 12/23.  He was told of having some CBD stones at that time as well which were removed.  Surgery was several months ago.  He has infrequent abdominal discomfort.  His bowel movements are regular.  His dyspepsia has resolved on omeprazole.  His appetite and his weight have improved.

## 2023-01-09 NOTE — PHYSICAL EXAM
[Alert] : alert [Normal Voice/Communication] : normal voice/communication [Healthy Appearing] : healthy appearing [No Acute Distress] : no acute distress [Sclera] : the sclera and conjunctiva were normal [Hearing Threshold Finger Rub Not [place]] : hearing was normal [Normal Lips/Gums] : the lips and gums were normal [Oropharynx] : the oropharynx was normal [Normal Appearance] : the appearance of the neck was normal [No Neck Mass] : no neck mass was observed [No Respiratory Distress] : no respiratory distress [No Acc Muscle Use] : no accessory muscle use [Respiration, Rhythm And Depth] : normal respiratory rhythm and effort [Auscultation Breath Sounds / Voice Sounds] : lungs were clear to auscultation bilaterally [Heart Rate And Rhythm] : heart rate was normal and rhythm regular [Normal S1, S2] : normal S1 and S2 [Murmurs] : no murmurs [Bowel Sounds] : normal bowel sounds [Abdomen Tenderness] : non-tender [No Masses] : no abdominal mass palpated [Abdomen Soft] : soft [] : no hepatosplenomegaly [No CVA Tenderness] : no CVA  tenderness [No Spinal Tenderness] : no spinal tenderness [Abnormal Walk] : normal gait [Oriented To Time, Place, And Person] : oriented to person, place, and time

## 2023-01-09 NOTE — ASSESSMENT
[FreeTextEntry1] : Patient is status postcholecystectomy and CBD stone removal.  He had a stent removed from the common bile duct on 12/23 and several CBD stones were removed as well.  He will be started on ursodiol twice daily to present further formation of CBD stones.\par Patient has had relief with omeprazole from his dyspepsia.  This will be changed to pantoprazole because he is on Plavix.\par He will have his PMD forward was his next blood results.\par FOBT has been positive in the past and will be repeated.

## 2023-01-11 LAB — HEMOCCULT STL QL IA: NEGATIVE

## 2023-01-11 RX ORDER — PANTOPRAZOLE 40 MG/1
40 TABLET, DELAYED RELEASE ORAL DAILY
Qty: 30 | Refills: 5 | Status: DISCONTINUED | COMMUNITY
Start: 2023-01-09 | End: 2023-01-11

## 2023-06-21 ENCOUNTER — APPOINTMENT (OUTPATIENT)
Dept: GASTROENTEROLOGY | Facility: CLINIC | Age: 78
End: 2023-06-21
Payer: MEDICARE

## 2023-06-21 VITALS
HEART RATE: 74 BPM | SYSTOLIC BLOOD PRESSURE: 144 MMHG | WEIGHT: 160 LBS | DIASTOLIC BLOOD PRESSURE: 81 MMHG | HEIGHT: 68 IN | TEMPERATURE: 98 F | BODY MASS INDEX: 24.25 KG/M2 | OXYGEN SATURATION: 97 %

## 2023-06-21 DIAGNOSIS — K80.50 CALCULUS OF BILE DUCT W/OUT CHOLANGITIS OR CHOLECYSTITIS W/OUT OBSTRUCTION: ICD-10-CM

## 2023-06-21 DIAGNOSIS — K58.1 IRRITABLE BOWEL SYNDROME WITH CONSTIPATION: ICD-10-CM

## 2023-06-21 DIAGNOSIS — Z86.79 PERSONAL HISTORY OF OTHER DISEASES OF THE CIRCULATORY SYSTEM: ICD-10-CM

## 2023-06-21 DIAGNOSIS — R10.9 UNSPECIFIED ABDOMINAL PAIN: ICD-10-CM

## 2023-06-21 PROCEDURE — 99214 OFFICE O/P EST MOD 30 MIN: CPT

## 2023-06-21 RX ORDER — AMOXICILLIN 500 MG/1
500 CAPSULE ORAL
Qty: 21 | Refills: 0 | Status: DISCONTINUED | COMMUNITY
Start: 2022-09-15 | End: 2023-06-21

## 2023-06-21 RX ORDER — URSODIOL 500 MG/1
500 TABLET ORAL TWICE DAILY
Qty: 180 | Refills: 1 | Status: ACTIVE | COMMUNITY
Start: 2023-01-11 | End: 1900-01-01

## 2023-06-21 RX ORDER — FAMOTIDINE 20 MG/1
20 TABLET, FILM COATED ORAL TWICE DAILY
Qty: 60 | Refills: 3 | Status: ACTIVE | COMMUNITY
Start: 2023-06-21 | End: 1900-01-01

## 2023-06-21 RX ORDER — AMOXICILLIN AND CLAVULANATE POTASSIUM 875; 125 MG/1; MG/1
875-125 TABLET, COATED ORAL
Qty: 28 | Refills: 0 | Status: DISCONTINUED | COMMUNITY
Start: 2022-07-15 | End: 2023-06-21

## 2023-06-21 NOTE — PHYSICAL EXAM
[Alert] : alert [Normal Voice/Communication] : normal voice/communication [Healthy Appearing] : healthy appearing [No Acute Distress] : no acute distress [Sclera] : the sclera and conjunctiva were normal [Hearing Threshold Finger Rub Not [place]] : hearing was normal [Normal Lips/Gums] : the lips and gums were normal [Oropharynx] : the oropharynx was normal [Normal Appearance] : the appearance of the neck was normal [No Neck Mass] : no neck mass was observed [No Respiratory Distress] : no respiratory distress [No Acc Muscle Use] : no accessory muscle use [Respiration, Rhythm And Depth] : normal respiratory rhythm and effort [Auscultation Breath Sounds / Voice Sounds] : lungs were clear to auscultation bilaterally [Heart Rate And Rhythm] : heart rate was normal and rhythm regular [Normal S1, S2] : normal S1 and S2 [Murmurs] : no murmurs [Bowel Sounds] : normal bowel sounds [Abdomen Tenderness] : non-tender [No Masses] : no abdominal mass palpated [Abdomen Soft] : soft [] : no hepatosplenomegaly [No CVA Tenderness] : no CVA  tenderness [No Spinal Tenderness] : no spinal tenderness [Abnormal Walk] : normal gait [Oriented To Time, Place, And Person] : oriented to person, place, and time

## 2023-06-21 NOTE — HISTORY OF PRESENT ILLNESS
[FreeTextEntry1] : Patient is a 78-year-old male with history of CAD with multiple cardiac stents on Plavix.  He also has a history of gout, hypertension, hyperlipidemia, and GERD.\par He is status post cholecystectomy and also ERCP and sphincterotomy with removal of common bile duct stones.  Patient had CBD stent removed on 12/23/2022.  He was told of having some CBD stones at that time as well which were removed.  Surgery was several months ago.  He has infrequent abdominal discomfort.  His bowel movements are regular.  His dyspepsia has resolved on omeprazole.  His appetite and his weight have improved.\par \par 6/21/2023-patient has been doing well.  He has not been taking Fatou.  He does complain of epigastric and mid abdominal cramping which occurs about 1/2-hour postprandially and is not relieved with pantoprazole.  Omeprazole had helped more but he was switched to pantoprazole most likely because he is on Plavix.\par His weight has been stable.  His bowel movements have been regular.

## 2023-06-21 NOTE — REASON FOR VISIT
[Follow-up] : a follow-up of an existing diagnosis [FreeTextEntry1] : Epigastric post prandial pain, Hx of CBD stones

## 2023-06-21 NOTE — ASSESSMENT
[FreeTextEntry1] : Patient with complaints of postprandial epigastric and mid abdominal cramping.  This is relieved when he eats but pantoprazole has not helped.  Omeprazole was more effective.  He will be given famotidine 20 mg twice daily.  MRA of the abdomen will be ordered in order to rule out possible mesenteric ischemia.\par Patient also has a history of common bile duct stones.  CBD stent was removed.  He did not take Fatou.  Fatou will be restarted.  MRCP will be ordered.

## 2023-06-26 RX ORDER — URSODIOL 200 MG/1
200 CAPSULE ORAL
Qty: 60 | Refills: 3 | Status: ACTIVE | COMMUNITY
Start: 2023-06-26 | End: 1900-01-01

## 2023-08-24 NOTE — ED PROVIDER NOTE - CONSTITUTIONAL, MLM
normal... Well appearing, well nourished, awake, alert, oriented to person, place, time/situation and in no apparent distress. Zoryve Counseling:  I discussed with the patient that Zoryve is not for use in the eyes, mouth or vagina. The most commonly reported side effects include diarrhea, headache, insomnia, application site pain, upper respiratory tract infections, and urinary tract infections.  All of the patient's questions and concerns were addressed.

## 2023-11-06 ENCOUNTER — APPOINTMENT (OUTPATIENT)
Dept: GASTROENTEROLOGY | Facility: CLINIC | Age: 78
End: 2023-11-06

## 2024-03-21 NOTE — ED ADULT NURSE NOTE - NS PRO PASSIVE SMOKE EXP
Last OV: Cervical cancer screen 1/22/24 MP    No future appointments.     Latest labs:   CMP,CBC, TSH,A1C 11/17/23    Latest RX:   Medication Quantity Refills Start End   amphetamine-dextroamphetamine 10 MG Oral Tab 30 tablet 0 2/2/2024 --   Sig:   Take 1 tablet (10 mg total) by mouth usama       Per protocol    Controlled Substance Medication [id number]/2024 12:17 PM    This medication is a controlled substance - forward to provider to refill         No

## 2024-11-17 NOTE — ED PROVIDER NOTE - PSH
CAD (coronary artery disease)  8 stents  Cataract  s/p extraction bilat 2/2014  H/O tooth extraction    S/P angioplasty with stent  x 3 ( 12/2001, 6/2014 & 01/2015)  S/P coronary angioplasty  8 stents  Ureteral stone  Insertion of right  ureteral stent - 9/2015 soft/nondistended/nontender

## 2025-02-05 NOTE — ED ADULT NURSE NOTE - RESPIRATORY WDL
3 Breathing spontaneous and unlabored. Breath sounds clear and equal bilaterally with regular rhythm.

## 2025-02-19 ENCOUNTER — NON-APPOINTMENT (OUTPATIENT)
Age: 80
End: 2025-02-19

## 2025-02-19 ENCOUNTER — APPOINTMENT (OUTPATIENT)
Dept: GASTROENTEROLOGY | Facility: CLINIC | Age: 80
End: 2025-02-19
Payer: MEDICARE

## 2025-02-19 VITALS
BODY MASS INDEX: 24.25 KG/M2 | DIASTOLIC BLOOD PRESSURE: 66 MMHG | HEIGHT: 68 IN | OXYGEN SATURATION: 97 % | SYSTOLIC BLOOD PRESSURE: 117 MMHG | HEART RATE: 70 BPM | TEMPERATURE: 98.2 F | WEIGHT: 160 LBS

## 2025-02-19 DIAGNOSIS — R14.0 ABDOMINAL DISTENSION (GASEOUS): ICD-10-CM

## 2025-02-19 DIAGNOSIS — K57.90 DIVERTICULOSIS OF INTESTINE, PART UNSPECIFIED, W/OUT PERFORATION OR ABSCESS W/OUT BLEEDING: ICD-10-CM

## 2025-02-19 DIAGNOSIS — K58.0 IRRITABLE BOWEL SYNDROME WITH DIARRHEA: ICD-10-CM

## 2025-02-19 DIAGNOSIS — R10.9 UNSPECIFIED ABDOMINAL PAIN: ICD-10-CM

## 2025-02-19 DIAGNOSIS — E73.9 LACTOSE INTOLERANCE, UNSPECIFIED: ICD-10-CM

## 2025-02-19 PROCEDURE — G2211 COMPLEX E/M VISIT ADD ON: CPT

## 2025-02-19 PROCEDURE — 99214 OFFICE O/P EST MOD 30 MIN: CPT

## 2025-02-19 RX ORDER — LACTASE 9000 UNIT
9000 TABLET ORAL
Qty: 90 | Refills: 5 | Status: ACTIVE | OUTPATIENT
Start: 2025-02-19

## 2025-02-19 RX ORDER — METRONIDAZOLE 500 MG/1
500 TABLET ORAL
Refills: 0 | Status: ACTIVE | COMMUNITY

## 2025-02-19 RX ORDER — RIFAXIMIN 550 MG/1
550 TABLET ORAL
Qty: 42 | Refills: 1 | Status: ACTIVE | COMMUNITY
Start: 2025-02-19 | End: 1900-01-01

## 2025-02-25 LAB — HEMOCCULT STL QL IA: NEGATIVE

## 2025-04-23 ENCOUNTER — APPOINTMENT (OUTPATIENT)
Dept: GASTROENTEROLOGY | Facility: CLINIC | Age: 80
End: 2025-04-23